# Patient Record
Sex: FEMALE | Race: WHITE | NOT HISPANIC OR LATINO | Employment: FULL TIME | URBAN - NONMETROPOLITAN AREA
[De-identification: names, ages, dates, MRNs, and addresses within clinical notes are randomized per-mention and may not be internally consistent; named-entity substitution may affect disease eponyms.]

---

## 2019-07-31 ENCOUNTER — OFFICE VISIT CONVERTED (OUTPATIENT)
Dept: FAMILY MEDICINE CLINIC | Age: 44
End: 2019-07-31
Attending: FAMILY MEDICINE

## 2019-07-31 ENCOUNTER — HOSPITAL ENCOUNTER (OUTPATIENT)
Dept: OTHER | Facility: HOSPITAL | Age: 44
Discharge: HOME | End: 2019-07-31
Attending: FAMILY MEDICINE

## 2019-07-31 LAB
ALBUMIN SERPL-MCNC: 4.5 G/DL (ref 3.5–5)
ALBUMIN/GLOB SERPL: 1.5 {RATIO} (ref 1.4–2.6)
ALP SERPL-CCNC: 81 U/L (ref 42–98)
ALT SERPL-CCNC: 18 U/L (ref 10–40)
ANION GAP SERPL CALC-SCNC: 19 MMOL/L (ref 8–19)
AST SERPL-CCNC: 19 U/L (ref 15–50)
BASOPHILS # BLD MANUAL: 0.01 10*3/UL (ref 0–0.2)
BASOPHILS NFR BLD MANUAL: 0.1 % (ref 0–3)
BILIRUB SERPL-MCNC: 0.25 MG/DL (ref 0.2–1.3)
BUN SERPL-MCNC: 9 MG/DL (ref 5–25)
BUN/CREAT SERPL: 10 {RATIO} (ref 6–20)
CALCIUM SERPL-MCNC: 9.6 MG/DL (ref 8.7–10.4)
CHLORIDE SERPL-SCNC: 101 MMOL/L (ref 99–111)
CHOLEST SERPL-MCNC: 134 MG/DL (ref 107–200)
CHOLEST/HDLC SERPL: 2.4 {RATIO} (ref 3–6)
CONV CO2: 24 MMOL/L (ref 22–32)
CONV TOTAL PROTEIN: 7.6 G/DL (ref 6.3–8.2)
CREAT UR-MCNC: 0.87 MG/DL (ref 0.5–0.9)
DEPRECATED RDW RBC AUTO: 45.4 FL
EOSINOPHIL # BLD MANUAL: 0.07 10*3/UL (ref 0–0.7)
EOSINOPHIL NFR BLD MANUAL: 1 % (ref 0–7)
ERYTHROCYTE [DISTWIDTH] IN BLOOD BY AUTOMATED COUNT: 13.2 % (ref 11.5–14.5)
GFR SERPLBLD BASED ON 1.73 SQ M-ARVRAT: >60 ML/MIN/{1.73_M2}
GLOBULIN UR ELPH-MCNC: 3.1 G/DL (ref 2–3.5)
GLUCOSE SERPL-MCNC: 82 MG/DL (ref 65–99)
GRANS (ABSOLUTE): 4.4 10*3/UL (ref 2–8)
GRANS: 60.8 % (ref 30–85)
HBA1C MFR BLD: 13.4 G/DL (ref 12–16)
HCT VFR BLD AUTO: 39.1 % (ref 37–47)
HDLC SERPL-MCNC: 56 MG/DL (ref 40–60)
IMM GRANULOCYTES # BLD: 0 10*3/UL (ref 0–0.54)
IMM GRANULOCYTES NFR BLD: 0 % (ref 0–0.43)
LDLC SERPL CALC-MCNC: 57 MG/DL (ref 70–100)
LYMPHOCYTES # BLD MANUAL: 2.24 10*3/UL (ref 1–5)
LYMPHOCYTES NFR BLD MANUAL: 7.1 % (ref 3–10)
MCH RBC QN AUTO: 31.6 PG (ref 27–31)
MCHC RBC AUTO-ENTMCNC: 34.3 G/DL (ref 33–37)
MCV RBC AUTO: 92.2 FL (ref 81–99)
MONOCYTES # BLD AUTO: 0.51 10*3/UL (ref 0.2–1.2)
OSMOLALITY SERPL CALC.SUM OF ELEC: 288 MOSM/KG (ref 273–304)
PLATELET # BLD AUTO: 380 10*3/UL (ref 130–400)
PMV BLD AUTO: 9.7 FL (ref 7.4–10.4)
POTASSIUM SERPL-SCNC: 4.1 MMOL/L (ref 3.5–5.3)
RBC # BLD AUTO: 4.24 10*6/UL (ref 4.2–5.4)
SODIUM SERPL-SCNC: 140 MMOL/L (ref 135–147)
TRIGL SERPL-MCNC: 104 MG/DL (ref 40–150)
TSH SERPL-ACNC: 1.27 M[IU]/L (ref 0.27–4.2)
VARIANT LYMPHS NFR BLD MANUAL: 31 % (ref 20–45)
VLDLC SERPL-MCNC: 21 MG/DL (ref 5–37)
WBC # BLD AUTO: 7.23 10*3/UL (ref 4.8–10.8)

## 2019-08-15 ENCOUNTER — OFFICE VISIT CONVERTED (OUTPATIENT)
Dept: PODIATRY | Facility: CLINIC | Age: 44
End: 2019-08-15
Attending: PODIATRIST

## 2019-08-19 ENCOUNTER — HOSPITAL ENCOUNTER (OUTPATIENT)
Dept: OTHER | Facility: HOSPITAL | Age: 44
Discharge: HOME | End: 2019-08-19
Attending: PODIATRIST

## 2019-08-22 ENCOUNTER — OFFICE VISIT CONVERTED (OUTPATIENT)
Dept: PODIATRY | Facility: CLINIC | Age: 44
End: 2019-08-22
Attending: PODIATRIST

## 2019-09-04 ENCOUNTER — OFFICE VISIT CONVERTED (OUTPATIENT)
Dept: FAMILY MEDICINE CLINIC | Age: 44
End: 2019-09-04
Attending: FAMILY MEDICINE

## 2019-10-31 ENCOUNTER — HOSPITAL ENCOUNTER (OUTPATIENT)
Dept: SLEEP MEDICINE | Facility: HOSPITAL | Age: 44
Discharge: HOME | End: 2019-10-31
Attending: INTERNAL MEDICINE

## 2019-11-07 ENCOUNTER — OFFICE VISIT CONVERTED (OUTPATIENT)
Dept: PODIATRY | Facility: CLINIC | Age: 44
End: 2019-11-07
Attending: PODIATRIST

## 2019-11-07 ENCOUNTER — HOSPITAL ENCOUNTER (OUTPATIENT)
Dept: OTHER | Facility: HOSPITAL | Age: 44
Discharge: HOME | End: 2019-11-07
Attending: PODIATRIST

## 2019-11-11 ENCOUNTER — HOSPITAL ENCOUNTER (OUTPATIENT)
Dept: SLEEP MEDICINE | Facility: HOSPITAL | Age: 44
Discharge: HOME | End: 2019-11-11
Attending: INTERNAL MEDICINE

## 2020-04-30 ENCOUNTER — OFFICE VISIT CONVERTED (OUTPATIENT)
Dept: FAMILY MEDICINE CLINIC | Age: 45
End: 2020-04-30
Attending: FAMILY MEDICINE

## 2020-08-21 ENCOUNTER — HOSPITAL ENCOUNTER (OUTPATIENT)
Dept: OTHER | Facility: HOSPITAL | Age: 45
Discharge: HOME | End: 2020-08-21
Attending: FAMILY MEDICINE

## 2020-08-21 ENCOUNTER — CONVERSION ENCOUNTER (OUTPATIENT)
Dept: FAMILY MEDICINE CLINIC | Age: 45
End: 2020-08-21

## 2020-08-23 LAB — SARS-COV-2 RNA SPEC QL NAA+PROBE: NOT DETECTED

## 2021-05-15 VITALS
WEIGHT: 180 LBS | HEIGHT: 66 IN | HEART RATE: 102 BPM | SYSTOLIC BLOOD PRESSURE: 136 MMHG | BODY MASS INDEX: 28.93 KG/M2 | OXYGEN SATURATION: 98 % | DIASTOLIC BLOOD PRESSURE: 87 MMHG

## 2021-05-15 VITALS
DIASTOLIC BLOOD PRESSURE: 79 MMHG | HEART RATE: 73 BPM | WEIGHT: 159 LBS | OXYGEN SATURATION: 100 % | HEIGHT: 66 IN | BODY MASS INDEX: 25.55 KG/M2 | SYSTOLIC BLOOD PRESSURE: 120 MMHG

## 2021-05-15 VITALS
DIASTOLIC BLOOD PRESSURE: 73 MMHG | BODY MASS INDEX: 25.55 KG/M2 | SYSTOLIC BLOOD PRESSURE: 133 MMHG | HEART RATE: 70 BPM | HEIGHT: 66 IN | OXYGEN SATURATION: 100 % | WEIGHT: 159 LBS

## 2021-05-18 NOTE — PROGRESS NOTES
"Ernestine Rhodes 1975     Office/Outpatient Visit    Visit Date: Wed, Jul 31, 2019 01:11 pm    Provider: Justo Kunz MD (Assistant: Amna Jauregui MA)    Location: [facility]        Electronically signed by Justo Kunz MD on  07/31/2019 06:31:58 PM                             SUBJECTIVE:        CC:     Mrs. Rhodes is a 44 year old White female.  This is her first visit to the clinic.  establishment, discuss depression. She states she broke L foot January 2019, she was told that it is better but is still having pain.;         HPI: Pt just moved here from [place], [place]. She moved here to help her dad who is struggling with dementia. Her  is retired [employer]. I see her dad Nahid Devi. She is originally from all over [place].         PHQ-9 Depression Screening: Completed form scanned and in chart; Total Score 19 Alcohol Consumption Screening: Completed form scanned and in chart; Total Score 6     BP today is 136/91 with HR of 80. She is on HCTZ 12.5 mg qd. She just got a refill from previous PCP, [facility]. She does not check BP between doctor visits.     Pt reports she is not feeling very well. She has a hard time sleeping at night but is very tired during the day. She has low motivation. She cannot concentrate, feels \"out of it.\" She is angry and irritable. She used to do crafts and painting but not anymore due to loss of interest. These Sx present for a few months but worse since moving to [place] 2 months ago. Moving here was her idea. She doesn't think her dad's health is the main factor. She bought an old house in [place] and she is planing her son's wedding which will be next week. 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 She was running/walking until about 2 months ago when she broke her foot.     Pt broke her left foot about 2 months ago. She was organizing, sitting on bed and her foot fell asleep. " She jumped off the bed and landed on it weird and broke 5th MT. This was treated non-operative with walking boot.     ROS:     CONSTITUTIONAL:  Negative for fatigue and fever.      EYES:  Negative for blurred vision.      E/N/T:  Negative for diminished hearing and nasal congestion.      CARDIOVASCULAR:  Negative for chest pain and palpitations.      RESPIRATORY:  Negative for recent cough and dyspnea.      GASTROINTESTINAL:  Negative for abdominal pain, constipation, diarrhea, nausea and vomiting.      GENITOURINARY:  Negative for dysuria and urinary incontinence.      MUSCULOSKELETAL:  Positive for limb pain ( left foot ).   Negative for arthralgias or myalgias.      INTEGUMENTARY/BREAST:  Negative for atypical mole(s) and rash.      NEUROLOGICAL:  Negative for paresthesias and weakness.      PSYCHIATRIC:  Positive for depression.   Negative for anxiety or sleep disturbance.          PMH/FMH/SH:     Last Reviewed on 2019 01:45 PM by Justo Kunz    Past Medical History:                 PAST MEDICAL HISTORY         Hypertension     Depression         PREVENTIVE HEALTH MAINTENANCE             MAMMOGRAM: was last done  with normal results     PAP SMEAR: was last done 2019 with normal results         Surgical History:         Appendectomy: ;       section: X 1; 1998;     carpal tunnel;         Family History:     Father: Coronary Artery Disease; Type 2 Diabetes; Hypertension;  dementia     Mother: Type 2 Diabetes; Hypertension; Obesity         Social History:     Occupation: Homemaker     Marital Status:  ( and remarried)     Children: 3 children (ages 24, 22, 19 )     Hobbies/Recreation: she enjoys crafts;     Mrs. Rhodes denies any current form of exercise.          Tobacco/Alcohol/Supplements:     Last Reviewed on 2019 01:45 PM by Justo Kunz    Tobacco: Current Smoker: She currently smokes some days, once every 3-4 weeks.          Alcohol: Frequency:     once a week; When she drinks, the average quantity of alcohol is 5-9 drinks.   She typically consumes white wine and bourbon.      Caffeine:  She admits to consuming caffeine via coffee ( -1/2 cup ).          Substance Abuse History:     Last Reviewed on 7/31/2019 01:45 PM by Justo Kunz        Marijuana: Prior (no current use).          Mental Health History:     Last Reviewed on 7/31/2019 01:45 PM by Justo Kunz        Panic Attacks      Major Depression         Communicable Diseases (eg STDs):     Last Reviewed on 7/31/2019 01:45 PM by Justo Kunz            Current Problems:     Last Reviewed on 7/31/2019 01:45 PM by Justo Kunz    Major depression, recurrent episode, moderate     Benign HTN     Screening for depression         Immunizations:     None        Allergies:     Last Reviewed on 7/31/2019 01:45 PM by Justo Kunz    Penicillins:    Egg:        Current Medications:     Last Reviewed on 7/31/2019 01:45 PM by Justo Kunz    Epinephrine Auto-Injector 0.3mg Solution For Injection use prn anaphylactic reaction     Hydrochlorothiazide (HCTZ) 12.5mg Tablet 1 po daily     Wellbutrin SR 150mg Tablets, Sustained Release 1 tab daily         OBJECTIVE:        Vitals:         Current: 7/31/2019 1:19:13 PM    Ht:  5 ft, 6 in;  Wt: 161.2 lbs;  BMI: 26.0    T: 98.4 F;  BP: 136/91 mm Hg (left arm, sitting);  P: 80 bpm (left arm (BP Cuff), sitting)        Exams:     PHYSICAL EXAM:     GENERAL: vital signs recorded - well developed, well nourished;  well groomed;  no apparent distress;     EYES: extraocular movements intact; conjunctiva and cornea are normal; PERRLA;     E/N/T: OROPHARYNX:  normal mucosa, dentition, gingiva, and posterior pharynx;     NECK: range of motion is normal; thyroid exam reveals not enlarged;     RESPIRATORY: normal respiratory rate and pattern with no distress; normal breath sounds with no rales, rhonchi, wheezes or rubs;     CARDIOVASCULAR: normal rate; rhythm is  regular;  no systolic murmur; no edema;     GASTROINTESTINAL: nontender; normal bowel sounds;     LYMPHATIC: no enlargement of cervical or facial nodes;     MUSCULOSKELETAL: normal gait; normal overall tone     NEUROLOGIC: mental status: alert and oriented x 3; Reflexes: knee jerks: 2+;     PSYCHIATRIC:  appropriate affect and demeanor; normal speech pattern; grossly normal memory;         ASSESSMENT           V79.0   Z13.31  Screening for depression              DDx:     401.1   I10  Benign HTN              DDx:     296.32   F33.1  Major depression, recurrent episode, moderate              DDx:     729.5   M25.572  Foot pain              DDx:         ORDERS:         Meds Prescribed:       Lisinopril 10mg Tablet 1 tab daily  #30 (Thirty) tablet(s) Refills: 2       Refill of: Wellbutrin SR (Bupropion HCl) 150mg Tablets, Sustained Release 1 tab daily  #30 (Thirty) tablet(s) Refills: 2       Sertraline HCl 50mg Tablet 1/2 tab for 2 weeks, then 1 tab qd thereafter.  #30 (Thirty) tablet(s) Refills: 2         Radiology/Test Orders:       [id number]  Left radiologic examination, foot; complete, minimum of three views  (Send-Out)           Lab Orders:       [id number]  [facility] PHYSICAL: CMP, CBC, TSH, LIPID: [id number], [id number], [id number], [id number]  (Send-Out)           Procedures Ordered:       REFER  Referral to Specialist or Other Facility  (Send-Out)           Other Orders:         Depression screen positive and follow up plan documented  (In-House)           Positive EtOH screen with counseling documented  (In-House)                   PLAN:          Screening for depression     MIPS PHQ-9 Depression Screening: Completed form scanned and in chart; Total Score 19 Positive Depression Screen: Suicide Risk Assessment completed--denies suicidal/homicidal ideation; Pharmacologic intervention initiated/modified Positive alcohol screen: Brief counseling on the harms of alcohol use (less than 8 minutes).            Orders:          Depression screen positive and follow up plan documented  (In-House)           Positive EtOH screen with counseling documented  (In-House)            Benign HTN Due to slightly elevated BP today I am starting lisinopril 10 mg qd in addition to HCTZ 1.5 mg qd. Pt is advised to check BP a couple times and ideally keep a log of BP results.     LABORATORY:  Labs ordered to be performed today include PHYSICAL PANEL; CMP, CBC, TSH, LIPID.            Prescriptions:       Lisinopril 10mg Tablet 1 tab daily  #30 (Thirty) tablet(s) Refills: 2           Orders:       [id number]  [facility] PHYSICAL: CMP, CBC, TSH, LIPID: [id number], [id number], [id number]  (Send-Out)            Major depression, recurrent episode, moderate Pt very much meets criteria for MDD. We discussed starting running again once foot heals. Exercise in general will help. I am adding sertraline to wellbutrin and she will f/u in 1 month.           Prescriptions:       Refill of: Wellbutrin SR (Bupropion HCl) 150mg Tablets, Sustained Release 1 tab daily  #30 (Thirty) tablet(s) Refills: 2       Sertraline HCl 50mg Tablet 1/2 tab for 2 weeks, then 1 tab qd thereafter.  #30 (Thirty) tablet(s) Refills: 2          Foot pain x-ray ordered and pt referred to podiatry.         RADIOLOGY:  I have ordered a left foot x-ray to be done today.      REFERRALS:  Referral initiated to a podiatrist ( [facility] ).            Orders:       [id number]  Left radiologic examination, foot; complete, minimum of three views  (Send-Out)         REFER  Referral to Specialist or Other Facility  (Send-Out)               CHARGE CAPTURE           **Please note: ICD descriptions below are intended for billing purposes only and may not represent clinical diagnoses**        Primary Diagnosis:         V79.0 Screening for depression            Z13.31    Encounter for screening for depression              Orders:          [id number]   Office visit - new pt, level 3  (In-House)                 Depression screen positive and follow up plan documented  (In-House)                Positive EtOH screen with counseling documented  (In-House)           401.1 Benign HTN            I10    Essential (primary) hypertension    296.32 Major depression, recurrent episode, moderate            F33.1    Major depressive disorder, recurrent, moderate    729.5 Foot pain            M25.572    Pain in left ankle and joints of left foot        ADDENDUMS:      ____________________________________    Date: 08/01/2019 09:50 AM    Author: Melissa Mckenna         Visit Note Faxed to:        [facility]  (Podiatry); Number [phone]

## 2021-05-18 NOTE — PROGRESS NOTES
"Ernestine Condon  1975     Office/Outpatient Visit    Visit Date: Thu, Apr 30, 2020 09:19 am    Provider: Justo Kunz MD (Assistant: Amna Jauregui MA)    Location: [facility]        Electronically signed by Justo Kunz MD on  04/30/2020 05:29:21 PM                             Subjective:        CC: NOT TAKING OMEPRAZOLEMrs. Roseanna is a 45 year old White female.  Patient states she wants to discuss getting off work for COVID due to her health issues.;         HPI:       She does not check BP between visits. She is on HCTZ 12.5 mg qd and lisinopril 10 mg qd but she has not been taking this recently. Pt had a sleep study on 11/18/19 that was positive for NATALIA. She is now using a CPAP.      Pt was recently on zoloft and welbutrin for anxiety and depression. These medicines were working well but when her refills ran out she did not request another and she reports her mood is \"fine.\"      For asthma she is on albuterol, sinugulair, cetirizine, and allergy shots. She works in [employer], unloading cans. She uses albuterol daily, she gets asthma exacerbation almost daily.     ROS:     CONSTITUTIONAL:  Positive for fatigue ( mild ).   Negative for fever.      E/N/T:  Positive for nasal congestion and sinus pressure.   Negative for diminished hearing.      CARDIOVASCULAR:  Negative for chest pain and palpitations.      RESPIRATORY:  Positive for recent cough ( with scant amounts of clear or white sputum ), dyspnea ( with moderate exertion ) and frequent wheezing.      GASTROINTESTINAL:  Positive for heartburn.   Negative for abdominal pain, constipation, diarrhea, nausea or vomiting.      MUSCULOSKELETAL:  Negative for arthralgias and myalgias.      INTEGUMENTARY/BREAST:  Negative for atypical mole(s) and rash.      NEUROLOGICAL:  Negative for paresthesias and weakness.      PSYCHIATRIC:  Positive for depression and insomnia.   Negative for anxiety.          Past Medical History / Family " History / Social History:         Last Reviewed on 2020 05:23 PM by Justo Kunz    Past Medical History:                 PAST MEDICAL HISTORY         Hypertension     Depression         PREVENTIVE HEALTH MAINTENANCE             MAMMOGRAM: was last done  with normal results     PAP SMEAR: was last done 2019 with normal results         Surgical History:         Appendectomy: 2016;      section: X 1; 1998;     carpal tunnel;         Family History:     Father: Coronary Artery Disease; Type 2 Diabetes; Hypertension;  dementia     Mother: Type 2 Diabetes; Hypertension; Obesity         Social History:     Occupation: Homemaker     Marital Status:  ( and remarried)     Children: 3 children (ages 24, 22, 19 )     Hobbies/Recreation: she enjoys crafts;     Mrs. Rhodes denies any current form of exercise.          Tobacco/Alcohol/Supplements:     Last Reviewed on 2020 05:23 PM by Justo Kunz    Tobacco: Current Smoker: She currently smokes some days, once every 3-4 weeks.          Alcohol: Frequency:    once a week; When she drinks, the average quantity of alcohol is 5-9 drinks.   She typically consumes white wine and bourbon.      Caffeine:  She admits to consuming caffeine via coffee ( -1/2 cup ).          Substance Abuse History:     Last Reviewed on 2020 05:23 PM by Justo Kunz        Marijuana: Prior (no current use).          Mental Health History:     Last Reviewed on 2020 05:23 PM by Justo Kunz        Panic Attacks     Major Depression         Communicable Diseases (eg STDs):     Last Reviewed on 2020 05:23 PM by Justo Kunz        Current Problems:     Last Reviewed on 2020 05:23 PM by Justo Kunz    Major depressive disorder, recurrent, moderate    Essential (primary) hypertension    Heartburn    Mild persistent asthma, uncomplicated        Immunizations:     None        Allergies:     Last Reviewed on 2020 05:23 PM  by Justo Kunz    Penicillins:      Egg:      Bee stings:          Current Medications:     Last Reviewed on 4/30/2020 05:23 PM by Justo Kunz    hydroCHLOROthiazide 12.5 mg oral tablet [1 po daily]    EPINEPHrine 0.3 mg/0.3 mL injection Auto-Injector [use prn anaphylactic reaction]    Omeprazole 20 mg oral capsule,delayed release (enteric coated) [one a day]    Allergy Inject  [3 times a week]    CPAP     MONTELUKAST 10MG TAB  [TAKE 1 TABLET BY MOUTH AT BEDTIME]    CETIRIZINE 10MG     TAB  [TAKE 1 TABLET BY MOUTH ONCE DAILY]    albuterol sulfate 90 mcg/actuation Inhalation HFA Aerosol Inhaler [inhale 2 puffs  by inhalation route every 6 hours as needed]        Objective:        Exams:     PHYSICAL EXAM:     GENERAL: well developed, well nourished;  well groomed;  no apparent distress, tired-appearing;     EYES: extraocular movements intact;     NECK: range of motion is normal;     RESPIRATORY: normal respiratory rate and pattern with no distress;     CARDIOVASCULAR: no cyanosis;     MUSCULOSKELETAL: normal gait;     NEUROLOGIC: mental status: alert and oriented x 3; GROSSLY INTACT     PSYCHIATRIC: affect/demeanor: depressed;  normal psychomotor function; normal speech pattern; normal thought and perception;         Assessment:         I10   Essential (primary) hypertension       F33.1   Major depressive disorder, recurrent, moderate       J45.40   Moderate persistent asthma, uncomplicated           ORDERS:         Meds Prescribed:       [Refilled] hydroCHLOROthiazide 12.5 mg oral tablet [1 tab po daily], #90 (ninety) tablets, Refills: 2 (two)       [New Rx] Advair -21 mcg/actuation Inhalation HFA Aerosol Inhaler [inhale 2 puffs by inhalation route 2 times per day in the morning andevening], #12 (twelve) grams, Refills: 2 (two)                 Plan:         Essential (primary) hypertensionWill restart HCTZ 12.5 mg qd for HTN. Pt advised to check BP once or twice a week and lisinopril 10 mg qd can be  added if needed.     Telehealth: Verbal consent obtained for visit to occur via televideo conferencing; Staff, other than provider, present during telephone visit include Amna Frankel School/Work Excuse for Today           Prescriptions:       [Refilled] hydroCHLOROthiazide 12.5 mg oral tablet [1 tab po daily], #90 (ninety) tablets, Refills: 2 (two)         Major depressive disorder, recurrent, moderateDepression appears to be stable at this time without medications. Will discuss more at f/u visit.         Moderate persistent asthma, uncomplicatedGiven patient moderate persistent asthma she will be granted time off work due to concerns over COVID 19 pandemic.          Prescriptions:       [New Rx] Advair -21 mcg/actuation Inhalation HFA Aerosol Inhaler [inhale 2 puffs by inhalation route 2 times per day in the morning andevening], #12 (twelve) grams, Refills: 2 (two)             Charge Capture:         Primary Diagnosis:     I10  Essential (primary) hypertension           Orders:      [id number]  Office/outpatient visit; established patient, level 4  (In-House)              F33.1  Major depressive disorder, recurrent, moderate     J45.40  Moderate persistent asthma, uncomplicated

## 2021-05-18 NOTE — PROGRESS NOTES
"Ernestine Condon 1975     Office/Outpatient Visit    Visit Date: Wed, Sep 4, 2019 10:18 am    Provider: Justo Kunz MD (Assistant: Amna Jauregui MA)    Location: [facility]        Electronically signed by Justo Kunz MD on  09/04/2019 06:44:20 PM                             SUBJECTIVE:        CC:     Ms. Condon is a 44 year old White female.  This is a follow-up visit.  check up;         HPI:     BP today is 134/81 with a HR of 92. She is on lisinopril 10 mg qd and HCTZ 12.5 mg qd.     1 month ago we added sertraline to welbutrin for depression. Her  thinks she's less depressed but she cannot tell a difference, maybe a little. She reports constant fatigue. Her son got  about 3 weeks ago and that went well. Sleep is getting better. Unsure if she snores or holds her breath at night. Labs 1 month ago were normal. She has low motivation. She cannot concentrate, feels \"out of it.\" She is angry and irritable. She used to do crafts and painting but not anymore due to loss of interest. These Sx present for a few months but worse since moving to [place] 3 months ago. Moving here was her idea.     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 She saw Dr. Elder on 8/22 (her second visit with him) and he ordered a walking boot for 6 weeks and a bone stimulator that she applies to her foot for 3 hours a day for the next 6 weeks. She will f/u with [facility] in about a month.     Pt has severe sinus and nasal congestion, watery eyes. Worse since moving to [place]. She takes claritin and flonase with a little relief. She has several allergies, bees, egg, wheat, barley.     Pt reports having bad acid reflux that can wake her up at night, burning sensation and acid in mouth. Her dad had esophageal cancer that was Dx'd in early 70s. She tries Tums and baking soda some times. No vomiting blood.     Pt reports excessive daytime somnolence. She has a tendency to " doze in the afternoon if sitting still or after lunch. She is sleepy when driving and even more so as a passenger. She is unsure if she snores or has breath holding spells at night.     ROS:     CONSTITUTIONAL:  Positive for fatigue ( severe; very sleepy ).   Negative for fever.      EYES:  Positive for eye drainage.   Negative for blurred vision.      E/N/T:  Positive for nasal congestion and sinus pressure.   Negative for diminished hearing.      CARDIOVASCULAR:  Negative for chest pain and palpitations.      RESPIRATORY:  Negative for recent cough and dyspnea.      GASTROINTESTINAL:  Positive for heartburn.   Negative for abdominal pain, constipation, diarrhea, nausea or vomiting.      GENITOURINARY:  Negative for dysuria and urinary incontinence.      MUSCULOSKELETAL:  Positive for limb pain ( left foot ).   Negative for arthralgias or myalgias.      INTEGUMENTARY/BREAST:  Negative for atypical mole(s) and rash.      NEUROLOGICAL:  Negative for paresthesias and weakness.      PSYCHIATRIC:  Positive for depression and insomnia.   Negative for anxiety.          PMH/FMH/SH:     Last Reviewed on 2019 06:40 PM by Justo Kunz    Past Medical History:                 PAST MEDICAL HISTORY         Hypertension     Depression         PREVENTIVE HEALTH MAINTENANCE             MAMMOGRAM: was last done  with normal results     PAP SMEAR: was last done 2019 with normal results         Surgical History:         Appendectomy: 2016;       section: X 1; ;     carpal tunnel;         Family History:     Father: Coronary Artery Disease; Type 2 Diabetes; Hypertension;  dementia     Mother: Type 2 Diabetes; Hypertension; Obesity         Social History:     Occupation: Homemaker     Marital Status:  ( and remarried)     Children: 3 children (ages 24, 22, 19 )     Hobbies/Recreation: she enjoys crafts;     Mrs. Rhodes denies any current form of exercise.           Tobacco/Alcohol/Supplements:     Last Reviewed on 9/04/2019 06:40 PM by Justo Kunz    Tobacco: Current Smoker: She currently smokes some days, once every 3-4 weeks.          Alcohol: Frequency:    once a week; When she drinks, the average quantity of alcohol is 5-9 drinks.   She typically consumes white wine and bourbon.      Caffeine:  She admits to consuming caffeine via coffee ( -1/2 cup ).          Substance Abuse History:     Last Reviewed on 9/04/2019 06:40 PM by Justo Kunz        Marijuana: Prior (no current use).          Mental Health History:     Last Reviewed on 9/04/2019 06:40 PM by Justo Kunz        Panic Attacks      Major Depression         Communicable Diseases (eg STDs):     Last Reviewed on 9/04/2019 06:40 PM by Justo Kunz            Current Problems:     Last Reviewed on 9/04/2019 06:40 PM by Justo Kunz    GERD     Major depression, recurrent episode, moderate     Benign HTN     Daytime somnolence     Seasonal allergies     Malunion of fracture     Foot pain     Screening for depression         Immunizations:     None        Allergies:     Last Reviewed on 9/04/2019 06:40 PM by Justo Kunz    Penicillins:    Egg:    Bee stings:        Current Medications:     Last Reviewed on 9/04/2019 06:40 PM by Justo Kunz    Lisinopril 10mg Tablet 1 tab daily     Sertraline HCl 50mg Tablet 1/2 tab for 2 weeks, then 1 tab qd thereafter.     Wellbutrin SR 150mg Tablets, Sustained Release 1 tab daily     Epinephrine Auto-Injector 0.3mg Solution For Injection use prn anaphylactic reaction     Hydrochlorothiazide (HCTZ) 12.5mg Tablet 1 po daily         OBJECTIVE:        Vitals:         Current: 9/4/2019 10:23:19 AM    Ht:  5 ft, 6 in;  Wt: 161.6 lbs;  BMI: 26.1    T: 98.1 F;  BP: 134/81 mm Hg (left arm, sitting);  P: 92 bpm (left arm (BP Cuff), sitting);  sCr: 0.87 mg/dL;  GFR: 84.34        Exams:     PHYSICAL EXAM:     GENERAL: vital signs recorded - well developed, well  nourished;  well groomed;  no apparent distress, tired-appearing;     EYES: extraocular movements intact; conjunctiva and cornea are normal; PERRLA;     E/N/T: OROPHARYNX:  normal mucosa, dentition, gingiva, and posterior pharynx;     NECK: range of motion is normal; thyroid exam reveals not enlarged;     RESPIRATORY: normal respiratory rate and pattern with no distress; normal breath sounds with no rales, rhonchi, wheezes or rubs;     CARDIOVASCULAR: normal rate; rhythm is regular;  no systolic murmur; no edema;     GASTROINTESTINAL: nontender; normal bowel sounds;     MUSCULOSKELETAL: normal gait; normal overall tone     NEUROLOGIC: mental status: alert and oriented x 3;     PSYCHIATRIC: affect/demeanor: depressed;  normal psychomotor function; normal speech pattern; normal thought and perception;         ASSESSMENT           401.1   I10  Benign HTN              DDx:     296.32   F33.1  Major depression, recurrent episode, moderate              DDx:     733.81   S42.002P  Malunion of fracture              DDx:     477.0   J30.2  Seasonal allergies              DDx:     530.81   R12  GERD              DDx:     780.02   R40.0  Daytime somnolence              DDx:         ORDERS:         Meds Prescribed:       Omeprazole 20mg Capsules, Extended Release one a day  #30 (Thirty) capsule(s) Refills: 2         Lab Orders:       APPTO  Appointment need  (In-House)           Procedures Ordered:       REFER  Referral to Specialist or Other Facility  (Send-Out)         REFER  Referral to Specialist or Other Facility  (Send-Out)                   PLAN:          Benign HTN Cont lisinopril 10 mg qd and HCTZ 12.5 mg qd.         FOLLOW-UP: Schedule a follow-up appointment in 7 weeks..            Orders:       APPTO  Appointment need  (In-House)            Major depression, recurrent episode, moderate Cont sertraline 50 mg qd as this seems to be helping a little, considering increasing dose to 100 mg qd.          Malunion of  fracture f/u with podiatry.          Seasonal allergies Pt referred for allergy testing.         REFERRALS:  Referral initiated to an allergist ( [facility] ).            Orders:       REFER  Referral to Specialist or Other Facility  (Send-Out)            GERD Will try omeprazle for GERD, we can increase to 40 mg qd if needed or switch to zantac or protonix.           Prescriptions:       Omeprazole 20mg Capsules, Extended Release one a day  #30 (Thirty) capsule(s) Refills: 2          Daytime somnolence Pt referred to [facility] sleep lab for excessive daytime somnolence concerning for NATALIA.         REFERRALS:  Referral initiated to [facility].            Orders:       REFER  Referral to Specialist or Other Facility  (Send-Out)               Patient Recommendations:        For  Benign HTN:     Schedule a follow-up visit in 7 weeks.                APPOINTMENT INFORMATION:        Monday Tuesday Wednesday Thursday Friday Saturday Sunday            Time:___________________AM  PM   Date:_____________________             CHARGE CAPTURE           **Please note: ICD descriptions below are intended for billing purposes only and may not represent clinical diagnoses**        Primary Diagnosis:         401.1 Benign HTN            I10    Essential (primary) hypertension              Orders:          23855   Office/outpatient visit; established patient, level 4  (In-House)             APPTO   Appointment need  (In-House)           296.32 Major depression, recurrent episode, moderate            F33.1    Major depressive disorder, recurrent, moderate    733.81 Malunion of fracture            S42.002P    Fracture of unspecified part of left clavicle, subsequent encounter for fracture with malunion    477.0 Seasonal allergies            J30.2    Other seasonal allergic rhinitis    530.81 GERD            R12    Heartburn    780.02 Daytime somnolence            R40.0    Somnolence

## 2021-07-01 VITALS
HEIGHT: 66 IN | SYSTOLIC BLOOD PRESSURE: 134 MMHG | TEMPERATURE: 98.1 F | WEIGHT: 161.6 LBS | DIASTOLIC BLOOD PRESSURE: 81 MMHG | HEART RATE: 92 BPM | BODY MASS INDEX: 25.97 KG/M2

## 2021-07-01 VITALS
SYSTOLIC BLOOD PRESSURE: 136 MMHG | DIASTOLIC BLOOD PRESSURE: 91 MMHG | BODY MASS INDEX: 25.91 KG/M2 | TEMPERATURE: 98.4 F | HEIGHT: 66 IN | HEART RATE: 80 BPM | WEIGHT: 161.2 LBS

## 2021-07-02 VITALS — BODY MASS INDEX: 29.05 KG/M2 | TEMPERATURE: 98.3 F | HEIGHT: 66 IN

## 2022-01-26 ENCOUNTER — TELEPHONE (OUTPATIENT)
Dept: FAMILY MEDICINE CLINIC | Age: 47
End: 2022-01-26

## 2022-01-26 NOTE — TELEPHONE ENCOUNTER
Caller: Ernestine Condon    Relationship: Self    Best call back number: [phone]     What is the medical concern/diagnosis: ALLERGIES    What specialty or service is being requested: ALLERGIST    What is the provider, practice or medical service name: [facility]    What is the office location: [place]    What is the office phone number:     Any additional details: PATIENT HAS NOT BEEN SEEN BY ANYONE IN THE OFFICE SINCE DR. CHASE. INSURANCE HAS SUGGESTED DR. CUEVAS. PATIENT DOES NOT WANT TO MAKE AN APPOINTMENT. SHE STATES SHE HAS BEEN GETTING THESE ALLERGY SHOTS 2 TIME A WEEK FOR THE PAST 2 YEARS.    PLEASE CALL PATIENT AND ADVISE REGARDING REFERRAL

## 2022-02-14 ENCOUNTER — TELEPHONE (OUTPATIENT)
Dept: FAMILY MEDICINE CLINIC | Age: 47
End: 2022-02-14

## 2022-02-17 ENCOUNTER — OFFICE VISIT (OUTPATIENT)
Dept: FAMILY MEDICINE CLINIC | Age: 47
End: 2022-02-17

## 2022-02-17 VITALS
TEMPERATURE: 98.4 F | DIASTOLIC BLOOD PRESSURE: 94 MMHG | OXYGEN SATURATION: 100 % | HEART RATE: 71 BPM | SYSTOLIC BLOOD PRESSURE: 154 MMHG

## 2022-02-17 DIAGNOSIS — R03.0 ELEVATED BLOOD PRESSURE READING WITHOUT DIAGNOSIS OF HYPERTENSION: Primary | ICD-10-CM

## 2022-02-17 DIAGNOSIS — J45.20 MILD INTERMITTENT ASTHMA, UNSPECIFIED WHETHER COMPLICATED: ICD-10-CM

## 2022-02-17 DIAGNOSIS — J30.2 SEASONAL ALLERGIES: ICD-10-CM

## 2022-02-17 PROCEDURE — 99213 OFFICE O/P EST LOW 20 MIN: CPT | Performed by: NURSE PRACTITIONER

## 2022-02-17 NOTE — PROGRESS NOTES
Chief Complaint  Ernestine Condon presents to [facility] for Allergic Reaction (Insurance needs referral to allergy )    Subjective          History of Present Illness   Here for referral to [facility], has Asthma and allergy, gets 2 shots 2 x a week but has [facility] and they require a new referral every year. Also noted at OV that BP was too high, has hx of elevated BP , was taking HCTZ when Dr Kunz was here but was able to stop it with weight loss, since gaining 30 pounds over last few months thinks that is why her BP was elevated today and does Vape. Needs new PCP and choose [facility]       Review of Systems   Constitutional: Negative for fatigue, fever, unexpected weight gain and unexpected weight loss.   HENT: Negative.    Eyes: Negative.    Respiratory: Negative for cough, shortness of breath and wheezing.    Cardiovascular: Negative for chest pain, palpitations and leg swelling.   Gastrointestinal: Negative for abdominal pain.   Endocrine: Negative.    Genitourinary: Negative for breast lump, breast pain, dysuria, frequency and urgency.   Musculoskeletal: Negative for gait problem.   Skin: Negative.    Neurological: Negative for dizziness, tremors, seizures, weakness and memory problem.   Psychiatric/Behavioral: Negative for behavioral problems and suicidal ideas.         Allergies   Allergen Reactions   • Fluarix [Influenza Virus Vaccine] Other (See Comments)     Pt did not give allergy       History reviewed. No pertinent past medical history.  No current outpatient medications on file.     No current facility-administered medications for this visit.     History reviewed. No pertinent surgical history.   Social History     Tobacco Use   • Smoking status: Not on file   • Smokeless tobacco: Current User   • Tobacco comment: Vapes    Vaping Use   • Vaping Use: Every day   • Substances: Nicotine   • Devices: Pre-filled or refillable cartridge    Substance Use Topics   • Alcohol use: Never   • Drug use: Never     History reviewed. No pertinent family history.  Health Maintenance Due   Topic Date Due   • COLORECTAL CANCER SCREENING  Never done   • ANNUAL PHYSICAL  Never done   • Pneumococcal Vaccine 0-64 (1 of 2 - PPSV23) Never done   • TDAP/TD VACCINES (1 - Tdap) Never done   • HEPATITIS C SCREENING  Never done   • PAP SMEAR  01/26/2022        There is no immunization history on file for this patient.     Objective     Vitals:    02/17/22 0949   BP: 154/94   BP Location: Right arm   Patient Position: Sitting   Pulse: 71   Temp: 98.4 °F (36.9 °C)   TempSrc: Oral   SpO2: 100%     There is no height or weight on file to calculate BMI.     Physical Exam  Constitutional:       Appearance: Normal appearance.   Neck:      Vascular: No carotid bruit.   Cardiovascular:      Rate and Rhythm: Normal rate and regular rhythm.      Heart sounds: Normal heart sounds.   Pulmonary:      Effort: Pulmonary effort is normal.      Breath sounds: Normal breath sounds.   Musculoskeletal:         General: Normal range of motion.   Skin:     General: Skin is warm and dry.   Neurological:      General: No focal deficit present.      Mental Status: She is alert.   Psychiatric:         Mood and Affect: Mood normal.         Behavior: Behavior normal.           Result Review :    No visits with results within 6 Month(s) from this visit.   Latest known visit with results is:   No results found for any previous visit.                        Assessment and Plan      Diagnoses and all orders for this visit:    1. Elevated blood pressure reading without diagnosis of hypertension (Primary)  Comments:  States has been on HCTZ in the past ,but not now . Instructed to check BP bid x 2 weeks and call to Office. Message to [facility] sent to follow up with pt.     2. Mild intermittent asthma, unspecified whether complicated  -     Ambulatory Referral to Allergy    3. Seasonal allergies  -      Ambulatory Referral to Allergy            Follow Up     No follow-ups on file.

## 2022-02-21 ENCOUNTER — TELEPHONE (OUTPATIENT)
Dept: FAMILY MEDICINE CLINIC | Age: 47
End: 2022-02-21

## 2022-02-23 ENCOUNTER — OFFICE VISIT (OUTPATIENT)
Dept: FAMILY MEDICINE CLINIC | Age: 47
End: 2022-02-23

## 2022-02-23 ENCOUNTER — LAB (OUTPATIENT)
Dept: LAB | Facility: HOSPITAL | Age: 47
End: 2022-02-23

## 2022-02-23 VITALS
HEART RATE: 80 BPM | HEIGHT: 66 IN | BODY MASS INDEX: 28.61 KG/M2 | SYSTOLIC BLOOD PRESSURE: 131 MMHG | DIASTOLIC BLOOD PRESSURE: 79 MMHG | WEIGHT: 178 LBS | TEMPERATURE: 98.2 F

## 2022-02-23 DIAGNOSIS — Z13.6 SCREENING FOR CARDIOVASCULAR CONDITION: ICD-10-CM

## 2022-02-23 DIAGNOSIS — I10 HYPERTENSION, UNSPECIFIED TYPE: ICD-10-CM

## 2022-02-23 DIAGNOSIS — Z12.11 SCREENING FOR COLON CANCER: ICD-10-CM

## 2022-02-23 DIAGNOSIS — R92.8 ABNORMAL MAMMOGRAM: Primary | ICD-10-CM

## 2022-02-23 PROBLEM — F32.9 MAJOR DEPRESSION: Status: ACTIVE | Noted: 2022-02-23

## 2022-02-23 PROBLEM — G47.30 SLEEP APNEA: Status: ACTIVE | Noted: 2022-02-23

## 2022-02-23 PROBLEM — Z86.16 HISTORY OF COVID-19: Status: ACTIVE | Noted: 2021-12-25

## 2022-02-23 PROBLEM — F41.0 PANIC ATTACKS: Status: ACTIVE | Noted: 2022-02-23

## 2022-02-23 LAB
ALBUMIN SERPL-MCNC: 4.7 G/DL (ref 3.5–5.2)
ALBUMIN/GLOB SERPL: 2.2 G/DL
ALP SERPL-CCNC: 92 U/L (ref 39–117)
ALT SERPL W P-5'-P-CCNC: 23 U/L (ref 1–33)
ANION GAP SERPL CALCULATED.3IONS-SCNC: 11.6 MMOL/L (ref 5–15)
AST SERPL-CCNC: 18 U/L (ref 1–32)
BASOPHILS # BLD AUTO: 0.02 10*3/MM3 (ref 0–0.2)
BASOPHILS NFR BLD AUTO: 0.2 % (ref 0–1.5)
BILIRUB SERPL-MCNC: 0.2 MG/DL (ref 0–1.2)
BUN SERPL-MCNC: 14 MG/DL (ref 6–20)
BUN/CREAT SERPL: 16.1 (ref 7–25)
CALCIUM SPEC-SCNC: 9.3 MG/DL (ref 8.6–10.5)
CHLORIDE SERPL-SCNC: 104 MMOL/L (ref 98–107)
CHOLEST SERPL-MCNC: 197 MG/DL (ref 0–200)
CO2 SERPL-SCNC: 21.4 MMOL/L (ref 22–29)
CREAT SERPL-MCNC: 0.87 MG/DL (ref 0.57–1)
DEPRECATED RDW RBC AUTO: 50.7 FL (ref 37–54)
EOSINOPHIL # BLD AUTO: 0.1 10*3/MM3 (ref 0–0.4)
EOSINOPHIL NFR BLD AUTO: 1.1 % (ref 0.3–6.2)
ERYTHROCYTE [DISTWIDTH] IN BLOOD BY AUTOMATED COUNT: 14.5 % (ref 12.3–15.4)
GFR SERPL CREATININE-BSD FRML MDRD: 69 ML/MIN/1.73
GLOBULIN UR ELPH-MCNC: 2.1 GM/DL
GLUCOSE SERPL-MCNC: 96 MG/DL (ref 65–99)
HCT VFR BLD AUTO: 39.5 % (ref 34–46.6)
HDLC SERPL-MCNC: 63 MG/DL (ref 40–60)
HGB BLD-MCNC: 13 G/DL (ref 12–15.9)
IMM GRANULOCYTES # BLD AUTO: 0.01 10*3/MM3 (ref 0–0.05)
IMM GRANULOCYTES NFR BLD AUTO: 0.1 % (ref 0–0.5)
LDLC SERPL CALC-MCNC: 121 MG/DL (ref 0–100)
LDLC/HDLC SERPL: 1.9 {RATIO}
LYMPHOCYTES # BLD AUTO: 2.17 10*3/MM3 (ref 0.7–3.1)
LYMPHOCYTES NFR BLD AUTO: 23.6 % (ref 19.6–45.3)
MCH RBC QN AUTO: 30.8 PG (ref 26.6–33)
MCHC RBC AUTO-ENTMCNC: 32.9 G/DL (ref 31.5–35.7)
MCV RBC AUTO: 93.6 FL (ref 79–97)
MONOCYTES # BLD AUTO: 0.52 10*3/MM3 (ref 0.1–0.9)
MONOCYTES NFR BLD AUTO: 5.7 % (ref 5–12)
NEUTROPHILS NFR BLD AUTO: 6.38 10*3/MM3 (ref 1.7–7)
NEUTROPHILS NFR BLD AUTO: 69.3 % (ref 42.7–76)
PLATELET # BLD AUTO: 316 10*3/MM3 (ref 140–450)
PMV BLD AUTO: 9.4 FL (ref 6–12)
POTASSIUM SERPL-SCNC: 4.2 MMOL/L (ref 3.5–5.2)
PROT SERPL-MCNC: 6.8 G/DL (ref 6–8.5)
RBC # BLD AUTO: 4.22 10*6/MM3 (ref 3.77–5.28)
SODIUM SERPL-SCNC: 137 MMOL/L (ref 136–145)
TRIGL SERPL-MCNC: 73 MG/DL (ref 0–150)
TSH SERPL DL<=0.05 MIU/L-ACNC: 1.33 UIU/ML (ref 0.27–4.2)
VLDLC SERPL-MCNC: 13 MG/DL (ref 5–40)
WBC NRBC COR # BLD: 9.2 10*3/MM3 (ref 3.4–10.8)

## 2022-02-23 PROCEDURE — 36415 COLL VENOUS BLD VENIPUNCTURE: CPT

## 2022-02-23 PROCEDURE — 80053 COMPREHEN METABOLIC PANEL: CPT

## 2022-02-23 PROCEDURE — 99214 OFFICE O/P EST MOD 30 MIN: CPT | Performed by: NURSE PRACTITIONER

## 2022-02-23 PROCEDURE — 80061 LIPID PANEL: CPT

## 2022-02-23 PROCEDURE — 85025 COMPLETE CBC W/AUTO DIFF WBC: CPT

## 2022-02-23 PROCEDURE — 84443 ASSAY THYROID STIM HORMONE: CPT

## 2022-02-23 RX ORDER — HYDROCHLOROTHIAZIDE 12.5 MG/1
12.5 TABLET ORAL DAILY
Qty: 90 TABLET | Refills: 1 | Status: SHIPPED | OUTPATIENT
Start: 2022-02-23 | End: 2022-05-24 | Stop reason: SDUPTHER

## 2022-02-23 NOTE — PROGRESS NOTES
Chief Complaint  Ernestine Condon presents to [facility] for mammo results    Subjective          Yani is here to establish care and to follow up on a recent abnormal mammogram.  She was a previous patient of Dr. Kunz who is no longer here and did not have an alternative provider to send the results to.  I have brought her in today to discuss the abnormalities and to have her get additional imaging. She denies any prior abnormal mammogram.  She denies any known family history of breast cancer.  She does do self breast exams most months.       Ernestine presents today for follow up on Hypertension.  Reported as not well controlled.  Cardiac symptoms none.  Current medication / treatment includes none - previous HCTZ  Cardiovascular risk factors: obesity (BMI >= 30 kg/m2)      .         Review of Systems      Allergies   Allergen Reactions   • Fluarix [Influenza Virus Vaccine] Other (See Comments)     Pt did not give allergy    • Penicillins Unknown - High Severity   • Wheat Rash      Past Medical History:   Diagnosis Date   • H/O:     • History of appendectomy    • History of carpal tunnel release      Current Outpatient Medications   Medication Sig Dispense Refill   • hydroCHLOROthiazide (HYDRODIURIL) 12.5 MG tablet Take 1 tablet by mouth Daily. 90 tablet 1     No current facility-administered medications for this visit.     History reviewed. No pertinent surgical history.   Social History     Tobacco Use   • Smoking status: Former Smoker     Packs/day: 0.25     Years: 10.00     Pack years: 2.50     Types: Cigarettes     Quit date: 2020     Years since quittin.0   • Smokeless tobacco: Never Used   • Tobacco comment: Vapes    Vaping Use   • Vaping Use: Every day   • Substances: Nicotine   • Devices: Pre-filled or refillable cartridge   Substance Use Topics   • Alcohol use: Never   • Drug use: Never     Family History   Problem Relation Age of Onset   • Obesity  "Mother    • Hypertension Mother    • Diabetes Mother    • Coronary artery disease Father    • Diabetes Father    • Hypertension Father    • Dementia Father    • Heart attack Father         at age 74     Health Maintenance Due   Topic Date Due   • ANNUAL PHYSICAL  Never done   • TDAP/TD VACCINES (1 - Tdap) Never done   • HEPATITIS C SCREENING  Never done        There is no immunization history on file for this patient.     Objective     Vitals:    02/23/22 0945 02/23/22 0949   BP: 150/98 131/79   BP Location: Left arm Left arm   Patient Position: Sitting Sitting   Pulse: 88 80   Temp: 98.2 °F (36.8 °C)    Weight: 80.7 kg (178 lb)    Height: 167.6 cm (65.98\")      Body mass index is 28.74 kg/m².     Physical Exam  Constitutional:       General: She is not in acute distress.     Appearance: Normal appearance.   HENT:      Head: Normocephalic.   Cardiovascular:      Rate and Rhythm: Normal rate and regular rhythm.   Pulmonary:      Effort: Pulmonary effort is normal.      Breath sounds: Normal breath sounds.   Musculoskeletal:         General: Normal range of motion.   Neurological:      General: No focal deficit present.      Mental Status: She is alert and oriented to person, place, and time.   Psychiatric:         Mood and Affect: Mood normal.         Behavior: Behavior normal.           Result Review :                               Assessment and Plan      Diagnoses and all orders for this visit:    1. Abnormal mammogram (Primary)  Comments:  We will get her scheduled for mammogram and US at [place] and call with any abnormal results and recommendations - continue self breast exams monthly  Orders:  -     Cancel: Mammo Diagnostic Digital Tomosynthesis Left With CAD; Future  -     Cancel: US Breast Left Limited; Future  -     Mammo Diagnostic Digital Tomosynthesis Left With CAD; Future  -     US Breast Left Limited; Future    2. Hypertension, unspecified type  Comments:  will resume HCTZ  recommend ambulatory " monitoring and follow up 3 months  or sooner if not well controlled  Orders:  -     hydroCHLOROthiazide (HYDRODIURIL) 12.5 MG tablet; Take 1 tablet by mouth Daily.  Dispense: 90 tablet; Refill: 1    3. Screening for colon cancer  -     Cologuard - Stool, Per Rectum; Future    4. Screening for cardiovascular condition  -     Basic metabolic panel; Future  -     Comprehensive Metabolic Panel; Future  -     CBC & Differential; Future  -     TSH; Future  -     Lipid Panel; Future              Follow Up     Return in about 3 months (around 5/23/2022).

## 2022-02-28 PROBLEM — R92.8 ABNORMAL MAMMOGRAM: Status: ACTIVE | Noted: 2022-02-28

## 2022-03-10 DIAGNOSIS — R92.8 ABNORMAL MAMMOGRAM: ICD-10-CM

## 2022-03-14 DIAGNOSIS — R92.8 ABNORMAL MAMMOGRAM: Primary | ICD-10-CM

## 2022-05-24 ENCOUNTER — OFFICE VISIT (OUTPATIENT)
Dept: FAMILY MEDICINE CLINIC | Age: 47
End: 2022-05-24

## 2022-05-24 VITALS
SYSTOLIC BLOOD PRESSURE: 130 MMHG | DIASTOLIC BLOOD PRESSURE: 89 MMHG | OXYGEN SATURATION: 97 % | HEIGHT: 66 IN | BODY MASS INDEX: 27.06 KG/M2 | HEART RATE: 104 BPM | WEIGHT: 168.4 LBS

## 2022-05-24 DIAGNOSIS — E66.3 OVERWEIGHT: Primary | ICD-10-CM

## 2022-05-24 DIAGNOSIS — F43.9 STRESS AT HOME: ICD-10-CM

## 2022-05-24 DIAGNOSIS — I10 HYPERTENSION, UNSPECIFIED TYPE: ICD-10-CM

## 2022-05-24 PROCEDURE — 99214 OFFICE O/P EST MOD 30 MIN: CPT | Performed by: NURSE PRACTITIONER

## 2022-05-24 RX ORDER — PHENTERMINE HYDROCHLORIDE 37.5 MG/1
37.5 TABLET ORAL EVERY MORNING
COMMUNITY
Start: 2022-03-28 | End: 2022-11-22

## 2022-05-24 RX ORDER — HYDROCHLOROTHIAZIDE 12.5 MG/1
12.5 TABLET ORAL DAILY
Qty: 90 TABLET | Refills: 1 | Status: SHIPPED | OUTPATIENT
Start: 2022-05-24 | End: 2023-02-13

## 2022-05-24 RX ORDER — EPINEPHRINE 0.3 MG/.3ML
INJECTION SUBCUTANEOUS
COMMUNITY
Start: 2022-03-09

## 2022-05-24 NOTE — PROGRESS NOTES
Assessment and Plan    Diagnoses and all orders for this visit:    1. Overweight (Primary)  Comments:  We will prescribe Contrave however we did discuss insurance coverage/online coupons.  Should she desire to continue this she will call for refills through next   Orders:  -     naltrexone-bupropion ER (CONTRAVE) 8-90 MG tablet; Wk 1: 1 tab daily, Wk 2: 1 tab twice a day, Wk 3: 2 tabs in AM, 1 tab in PM, Wk 4: 2 tabs twice a day, Maintenance dose: 2 tabs twice daily.  Dispense: 120 tablet; Refill: 3    2. Stress at home  Comments:  Referral to psychotherapy per her request  Orders:  -     Ambulatory Referral to Psychotherapy    3. Hypertension, unspecified type  Comments:  I have advised her to check BP at home.  Follow-up sooner should blood pressure remain above 130/80 otherwise 6-month follow-up  Orders:  -     hydroCHLOROthiazide (HYDRODIURIL) 12.5 MG tablet; Take 1 tablet by mouth Daily.  Dispense: 90 tablet; Refill: 1        Follow Up   Return in about 6 months (around 11/24/2022) for Recheck, Annual physical.    Chief Complaint  Ernestine Powell Condon presents to [facility] for Hypertension (3 month follow up)    Subjective          Ernestine presents today for follow up on Hypertension.  Not well controlled.  Cardiac symptoms none.  Current medication / treatment includes hydrochlorothiazide  Has not taken medication today and does not check her BP at home  Cardiovascular risk factors: hypertension      Currently taking Phentermine 1/2 pill Has been on this for 2 month and has lost a total of 10 pounds.  She is requesting us to manage  Her phentermine however we did discuss that I generally do not prescribe this medication due to side effects.  She is requesting alternative therapy.      Ernestine Powell is requesting therapist - would like counseling due to 'life in general'  Never been in counseling in the past individually however she does participate in some of her [facility]  [facility]. No medication is desired   Previously on medication for depression and does not wish to pursue medication management.            Review of Systems    Objective     Vitals:    22 0758   BP: 130/89   BP Location: Left arm   Patient Position: Sitting   Cuff Size: Adult   Pulse: 104   SpO2: 97%   Weight: 76.4 kg (168 lb 6.4 oz)   Height: 167.6 cm (65.98\")     Body mass index is 27.2 kg/m².     Physical Exam  Constitutional:       General: She is not in acute distress.     Appearance: Normal appearance.   HENT:      Head: Normocephalic.   Cardiovascular:      Rate and Rhythm: Normal rate and regular rhythm.   Pulmonary:      Effort: Pulmonary effort is normal.      Breath sounds: Normal breath sounds.   Musculoskeletal:         General: Normal range of motion.   Neurological:      General: No focal deficit present.      Mental Status: She is alert and oriented to person, place, and time.   Psychiatric:         Mood and Affect: Mood normal. Affect is flat.         Behavior: Behavior normal.         Result Review :                    Allergies   Allergen Reactions   • Fluarix [Influenza Virus Vaccine] Other (See Comments)     Pt did not give allergy    • Penicillins Unknown - High Severity   • Wheat Rash      Past Medical History:   Diagnosis Date   • H/O:     • History of appendectomy    • History of carpal tunnel release      Current Outpatient Medications   Medication Sig Dispense Refill   • EPINEPHrine (EPIPEN) 0.3 MG/0.3ML solution auto-injector injection USE AS DIRECTED FOR ACUTE ALLERGIC REACTION     • hydroCHLOROthiazide (HYDRODIURIL) 12.5 MG tablet Take 1 tablet by mouth Daily. 90 tablet 1   • phentermine (ADIPEX-P) 37.5 MG tablet Take 37.5 mg by mouth Every Morning.     • ProAir  (90 Base) MCG/ACT inhaler      • naltrexone-bupropion ER (CONTRAVE) 8-90 MG tablet Wk 1: 1 tab daily, Wk 2: 1 tab twice a day, Wk 3: 2 tabs in AM, 1 tab in PM, Wk 4: 2 tabs twice a day, " Maintenance dose: 2 tabs twice daily. 120 tablet 3     No current facility-administered medications for this visit.     History reviewed. No pertinent surgical history.   Social History     Tobacco Use   • Smoking status: Former Smoker     Packs/day: 0.25     Years: 10.00     Pack years: 2.50     Types: Cigarettes     Quit date: 2020     Years since quittin.2   • Smokeless tobacco: Never Used   • Tobacco comment: Vapes    Vaping Use   • Vaping Use: Every day   • Substances: Nicotine   • Devices: Pre-filled or refillable cartridge   Substance Use Topics   • Alcohol use: Yes     Comment: once in awhile   • Drug use: Never     Family History   Problem Relation Age of Onset   • Obesity Mother    • Hypertension Mother    • Diabetes Mother    • Coronary artery disease Father    • Diabetes Father    • Hypertension Father    • Dementia Father    • Heart attack Father         at age 74     Health Maintenance Due   Topic Date Due   • COLORECTAL CANCER SCREENING  Never done   • ANNUAL PHYSICAL  Never done   • COVID-19 Vaccine (1) Never done   • Pneumococcal Vaccine 0-64 (1 - PCV) Never done   • TDAP/TD VACCINES (1 - Tdap) Never done   • HEPATITIS C SCREENING  Never done        There is no immunization history on file for this patient.

## 2022-06-14 ENCOUNTER — PRIOR AUTHORIZATION (OUTPATIENT)
Dept: FAMILY MEDICINE CLINIC | Age: 47
End: 2022-06-14

## 2022-09-12 ENCOUNTER — HOSPITAL ENCOUNTER (OUTPATIENT)
Dept: MAMMOGRAPHY | Facility: HOSPITAL | Age: 47
Discharge: HOME | End: 2022-09-12

## 2022-09-12 ENCOUNTER — HOSPITAL ENCOUNTER (OUTPATIENT)
Dept: ULTRASOUND IMAGING | Facility: HOSPITAL | Age: 47
Discharge: HOME | End: 2022-09-12

## 2022-09-12 DIAGNOSIS — R92.8 ABNORMAL MAMMOGRAM: ICD-10-CM

## 2022-09-12 PROCEDURE — 77065 DX MAMMO INCL CAD UNI: CPT

## 2022-09-12 PROCEDURE — G0279 TOMOSYNTHESIS, MAMMO: HCPCS

## 2022-09-13 ENCOUNTER — TELEPHONE (OUTPATIENT)
Dept: FAMILY MEDICINE CLINIC | Age: 47
End: 2022-09-13

## 2022-09-13 NOTE — TELEPHONE ENCOUNTER
"Spoke with pt about overdue cologuard .. pt stated \"I thought I had up to a year to complete? I will get it done in the next couple of months\" .. I postponed 9/13/2022 /al   "

## 2022-11-22 ENCOUNTER — OFFICE VISIT (OUTPATIENT)
Dept: FAMILY MEDICINE CLINIC | Age: 47
End: 2022-11-22

## 2022-11-22 VITALS
HEART RATE: 90 BPM | DIASTOLIC BLOOD PRESSURE: 80 MMHG | TEMPERATURE: 98.1 F | BODY MASS INDEX: 26.33 KG/M2 | SYSTOLIC BLOOD PRESSURE: 130 MMHG | WEIGHT: 163.8 LBS | HEIGHT: 66 IN | OXYGEN SATURATION: 100 %

## 2022-11-22 DIAGNOSIS — Z12.4 SCREENING FOR CERVICAL CANCER: ICD-10-CM

## 2022-11-22 DIAGNOSIS — R53.83 FATIGUE, UNSPECIFIED TYPE: ICD-10-CM

## 2022-11-22 DIAGNOSIS — Z01.419 WELL WOMAN EXAM WITH ROUTINE GYNECOLOGICAL EXAM: Primary | ICD-10-CM

## 2022-11-22 PROCEDURE — 99396 PREV VISIT EST AGE 40-64: CPT | Performed by: NURSE PRACTITIONER

## 2022-11-22 PROCEDURE — G0123 SCREEN CERV/VAG THIN LAYER: HCPCS | Performed by: NURSE PRACTITIONER

## 2022-11-22 PROCEDURE — 87624 HPV HI-RISK TYP POOLED RSLT: CPT | Performed by: NURSE PRACTITIONER

## 2022-11-22 NOTE — PROGRESS NOTES
Assessment and Plan   Diagnoses and all orders for this visit:    1. Well woman exam with routine gynecological exam (Primary)  Comments:  annual well woman, PAP every 3 years, HPV every 5 years;  mammogram due in March   Orders:  -     IgP, Aptima HPV; Future  -     IgP, Aptima HPV    2. Fatigue, unspecified type  Comments:  sounds like possible depression  - advised to start contrave (component of wellbutrin)  may need alternative treatment for her mood if persists/worsen    3. Screening for cervical cancer  -     IgP, Aptima HPV; Future  -     IgP, Aptima HPV                  Follow Up   Return in about 8 weeks (around 1/17/2023) for Recheck.    Chief Complaint  Ernestine Condon presents to [facility] for Annual Exam (Physical with pap/CC: patient says she is always fatigued and would like to get some blood work completed. )    Subjective          History of Present Illness  WELL WOMAN EXAM:  Last well woman  exam was 3 year(s) ago.    Last pap smear was done 3 years ago and reported as normal.  Last HPV testing  Think may   Last menstrual period was 11/07/2022.  Reports that her periods have been change in cycle/ heaviness    Current method of contraception is IUD.    Sexually active?  yes  Self breast exams monthly.  Denies any concerns with findings on exam.    Last mammogram Last Completed Mammogram     This patient has no relevant Health Maintenance data.      and reported as abnormal.    Cysts but recommended 6 month follow up due in March 2023    Fatigue: Patient complains of fatigue. Symptoms began several months ago. Has been having some problems with depression / tearful and feeling down/ no motivation to do anything .  She feels like she could sleep all day.  .  She is concerned about her weight gain and her inability to lose weight.  She was prescribed contrave but was afraid to take the mediation due to warning of suicidal ideation.          Review of Systems  "  Constitutional: Positive for fatigue.   Respiratory: Negative for shortness of breath.    Cardiovascular: Negative for chest pain.   Genitourinary: Positive for menstrual problem (change in cycle).   Psychiatric/Behavioral: Positive for sleep disturbance and depressed mood.       Objective     Vitals:    22 1414   BP: 130/80   BP Location: Left arm   Patient Position: Sitting   Cuff Size: Adult   Pulse: 90   Temp: 98.1 °F (36.7 °C)   TempSrc: Oral   SpO2: 100%   Weight: 74.3 kg (163 lb 12.8 oz)   Height: 167.6 cm (65.98\")     Body mass index is 26.45 kg/m².     Physical Exam  Constitutional:       General: She is not in acute distress.     Appearance: Normal appearance.   HENT:      Head: Normocephalic.   Cardiovascular:      Rate and Rhythm: Normal rate and regular rhythm.   Pulmonary:      Effort: Pulmonary effort is normal.      Breath sounds: Normal breath sounds.   Genitourinary:     Exam position: Lithotomy position.      Pubic Area: No rash.       Labia:         Right: No rash or lesion.         Left: No rash or lesion.       Urethra: No prolapse or urethral pain.      Vagina: Normal.      Cervix: Discharge present.      Uterus: Normal.       Adnexa: Right adnexa normal and left adnexa normal.      Comments: IUD string present  Musculoskeletal:         General: Normal range of motion.   Neurological:      General: No focal deficit present.      Mental Status: She is alert and oriented to person, place, and time.   Psychiatric:         Mood and Affect: Mood normal.         Behavior: Behavior normal.         Result Review                        Allergies   Allergen Reactions   • Fluarix [Influenza Virus Vaccine] Other (See Comments)     Pt did not give allergy    • Penicillins Unknown - High Severity   • Barley Grass Rash   • Eggs Or Egg-Derived Products Rash   • Wheat Rash      Past Medical History:   Diagnosis Date   • H/O:     • History of appendectomy    • History of carpal tunnel release  "     Current Outpatient Medications   Medication Sig Dispense Refill   • EPINEPHrine (EPIPEN) 0.3 MG/0.3ML solution auto-injector injection USE AS DIRECTED FOR ACUTE ALLERGIC REACTION     • hydroCHLOROthiazide (HYDRODIURIL) 12.5 MG tablet Take 1 tablet by mouth Daily. 90 tablet 1   • ProAir  (90 Base) MCG/ACT inhaler Inhale 2 puffs As Needed.     • naltrexone-bupropion ER (CONTRAVE) 8-90 MG tablet Wk 1: 1 tab daily, Wk 2: 1 tab twice a day, Wk 3: 2 tabs in AM, 1 tab in PM, Wk 4: 2 tabs twice a day, Maintenance dose: 2 tabs twice daily. 120 tablet 3     No current facility-administered medications for this visit.     History reviewed. No pertinent surgical history.   Health Maintenance Due   Topic Date Due   • COLORECTAL CANCER SCREENING  Never done   • ANNUAL PHYSICAL  Never done   • HEPATITIS C SCREENING  Never done        There is no immunization history on file for this patient.

## 2022-11-23 ENCOUNTER — TELEPHONE (OUTPATIENT)
Dept: FAMILY MEDICINE CLINIC | Age: 47
End: 2022-11-23

## 2022-11-23 DIAGNOSIS — F33.1 MODERATE EPISODE OF RECURRENT MAJOR DEPRESSIVE DISORDER: Primary | ICD-10-CM

## 2022-11-23 RX ORDER — BUPROPION HYDROCHLORIDE 150 MG/1
150 TABLET ORAL DAILY
Qty: 30 TABLET | Refills: 2 | Status: SHIPPED | OUTPATIENT
Start: 2022-11-23 | End: 2023-03-21

## 2022-11-23 NOTE — TELEPHONE ENCOUNTER
Relationship: Self    Best call back number: [phone]    What medication are you requesting: WELLBUTRIN     What are your current symptoms: DEPTRSSION    If a prescription is needed, what is your preferred pharmacy and phone number: [id number] - [place], [place] - [address] [facility] [place] [address]  - [phone]  - [phone] FX     Additional notes: INSURANCE DOES NOT COVER THE CONTRAVE  AND SHE WOULD LIKE A NEW PRESCRIPTION FOR THE WELLBUTRIN SENT TO THE PHARMACY PLEASE NOTIFY WHEN SENT

## 2022-11-30 LAB
CYTOLOGIST CVX/VAG CYTO: NORMAL
CYTOLOGY CVX/VAG DOC CYTO: NORMAL
CYTOLOGY CVX/VAG DOC THIN PREP: NORMAL
DX ICD CODE: NORMAL
HIV 1 & 2 AB SER-IMP: NORMAL
HPV I/H RISK 4 DNA CVX QL PROBE+SIG AMP: NEGATIVE
OTHER STN SPEC: NORMAL
STAT OF ADQ CVX/VAG CYTO-IMP: NORMAL

## 2023-02-13 DIAGNOSIS — I10 HYPERTENSION, UNSPECIFIED TYPE: ICD-10-CM

## 2023-02-13 RX ORDER — HYDROCHLOROTHIAZIDE 12.5 MG/1
12.5 TABLET ORAL DAILY
Qty: 90 TABLET | Refills: 0 | Status: SHIPPED | OUTPATIENT
Start: 2023-02-13

## 2023-03-21 DIAGNOSIS — F33.1 MODERATE EPISODE OF RECURRENT MAJOR DEPRESSIVE DISORDER: ICD-10-CM

## 2023-03-21 RX ORDER — BUPROPION HYDROCHLORIDE 150 MG/1
150 TABLET ORAL DAILY
Qty: 30 TABLET | Refills: 2 | Status: SHIPPED | OUTPATIENT
Start: 2023-03-21

## 2023-03-30 ENCOUNTER — TELEPHONE (OUTPATIENT)
Dept: FAMILY MEDICINE CLINIC | Age: 48
End: 2023-03-30
Payer: OTHER GOVERNMENT

## 2023-03-30 DIAGNOSIS — J45.20 MILD INTERMITTENT ASTHMA, UNSPECIFIED WHETHER COMPLICATED: Primary | ICD-10-CM

## 2023-05-20 DIAGNOSIS — I10 HYPERTENSION, UNSPECIFIED TYPE: ICD-10-CM

## 2023-05-23 NOTE — TELEPHONE ENCOUNTER
[facility] SHARED 05/23/2023  APPOINTMENT SCHEDULED   INSURANCE HAS CHANGED HER PCP TO CLEMENT MARIE

## 2023-05-24 RX ORDER — HYDROCHLOROTHIAZIDE 12.5 MG/1
12.5 TABLET ORAL DAILY
Qty: 30 TABLET | Refills: 0 | Status: SHIPPED | OUTPATIENT
Start: 2023-05-24

## 2023-06-06 DIAGNOSIS — F33.1 MODERATE EPISODE OF RECURRENT MAJOR DEPRESSIVE DISORDER: ICD-10-CM

## 2023-06-06 DIAGNOSIS — I10 HYPERTENSION, UNSPECIFIED TYPE: ICD-10-CM

## 2023-06-06 RX ORDER — BUPROPION HYDROCHLORIDE 150 MG/1
150 TABLET ORAL DAILY
Qty: 30 TABLET | Refills: 1 | Status: SHIPPED | OUTPATIENT
Start: 2023-06-06

## 2023-06-06 RX ORDER — HYDROCHLOROTHIAZIDE 12.5 MG/1
12.5 TABLET ORAL DAILY
Qty: 30 TABLET | Refills: 1 | Status: SHIPPED | OUTPATIENT
Start: 2023-06-06

## 2023-07-19 ENCOUNTER — OFFICE VISIT (OUTPATIENT)
Dept: FAMILY MEDICINE CLINIC | Age: 48
End: 2023-07-19
Payer: OTHER GOVERNMENT

## 2023-07-19 VITALS
HEART RATE: 84 BPM | SYSTOLIC BLOOD PRESSURE: 133 MMHG | WEIGHT: 171 LBS | OXYGEN SATURATION: 100 % | DIASTOLIC BLOOD PRESSURE: 89 MMHG | HEIGHT: 66 IN | BODY MASS INDEX: 27.48 KG/M2

## 2023-07-19 DIAGNOSIS — F41.0 PANIC ATTACKS: ICD-10-CM

## 2023-07-19 DIAGNOSIS — R53.83 OTHER FATIGUE: Primary | ICD-10-CM

## 2023-07-19 DIAGNOSIS — G47.30 SLEEP APNEA, UNSPECIFIED TYPE: ICD-10-CM

## 2023-07-19 DIAGNOSIS — M25.551 BILATERAL HIP PAIN: ICD-10-CM

## 2023-07-19 DIAGNOSIS — F33.1 MODERATE EPISODE OF RECURRENT MAJOR DEPRESSIVE DISORDER: ICD-10-CM

## 2023-07-19 DIAGNOSIS — J45.20 MILD INTERMITTENT ASTHMA, UNSPECIFIED WHETHER COMPLICATED: ICD-10-CM

## 2023-07-19 DIAGNOSIS — R92.8 ABNORMAL MAMMOGRAM: ICD-10-CM

## 2023-07-19 DIAGNOSIS — N92.6 IRREGULAR MENSTRUAL CYCLE: ICD-10-CM

## 2023-07-19 DIAGNOSIS — Z12.31 ENCOUNTER FOR SCREENING MAMMOGRAM FOR MALIGNANT NEOPLASM OF BREAST: ICD-10-CM

## 2023-07-19 DIAGNOSIS — M25.552 BILATERAL HIP PAIN: ICD-10-CM

## 2023-07-19 DIAGNOSIS — I10 HYPERTENSION, UNSPECIFIED TYPE: ICD-10-CM

## 2023-07-19 PROCEDURE — 99214 OFFICE O/P EST MOD 30 MIN: CPT | Performed by: NURSE PRACTITIONER

## 2023-07-19 RX ORDER — COPPER 313.4 MG/1
1 INTRAUTERINE DEVICE INTRAUTERINE ONCE
COMMUNITY

## 2023-07-19 RX ORDER — VENLAFAXINE HYDROCHLORIDE 37.5 MG/1
37.5 CAPSULE, EXTENDED RELEASE ORAL DAILY
Qty: 30 CAPSULE | Refills: 1 | Status: SHIPPED | OUTPATIENT
Start: 2023-07-19

## 2023-07-19 RX ORDER — HYDROCHLOROTHIAZIDE 12.5 MG/1
12.5 TABLET ORAL DAILY
Qty: 30 TABLET | Refills: 1 | Status: SHIPPED | OUTPATIENT
Start: 2023-07-19

## 2023-07-19 NOTE — PROGRESS NOTES
Chief Complaint  Ernestine Condon presents to [facility] for Follow-up (Wants to discuss blood work. ), Med Refill, Fatigue (No energy ), and Muscle Pain      Subjective     History of Present Illness  Ernestine is here today on some longstanding problems.  She reports that she has been having some extensive hip pain and wants to make sure there is no underlying cause of her symptoms.  She reports that she has also been having some extreme fatigue.Fatigue: Patient complains of fatigue. Symptoms began several months ago. Sentinal symptom the patient feels fatigue began with: significant change in weight, symptoms of arthritis, and change in menstrual cycle (missing a few months LMP 7/7. Symptoms of her fatigue have been feelings of depression and general malaise. Patient describes the following psychologic symptoms: .  Patient denies fever, unusual rashes, excessive menstrual bleeding, and witnessed or suspected sleep apnea. Symptoms have gradually worsened. Severity has been struggles to carry out day to day responsibilities..  She is interested in checking hormone levels although we discussed today that more than likely she is experiencing perimenopausal symptoms since she has missed a few months during her cycles.    She does not feel that her depression and anxiety medication is working for her.  She would like a change.  She is currently takingWellbutrin but wants to make sure that the new medication does not cause weight gain.        Assessment and Plan       Diagnoses and all orders for this visit:    1. Other fatigue (Primary)  Comments:  labs / sleep study if no indication from labs  Orders:  -     Comprehensive metabolic panel; Future  -     TSH Rfx On Abnormal To Free T4; Future  -     IVON by IFA, Reflex 9-biomarkers profile; Future  -     Sedimentation rate, automated; Future  -     CBC & Differential; Future    2. Moderate episode of recurrent major depressive  disorder  Comments:  medication change as discussed  follow up in 6-8 weeks  Orders:  -     venlafaxine XR (Effexor XR) 37.5 MG 24 hr capsule; Take 1 capsule by mouth Daily.  Dispense: 30 capsule; Refill: 1    3. Bilateral hip pain  Comments:  check arthritis labs  defer imaging today  consider if persists  Orders:  -     Comprehensive metabolic panel; Future  -     TSH Rfx On Abnormal To Free T4; Future  -     Sedimentation rate, automated; Future    4. Sleep apnea, unspecified type  -     Cancel: Ambulatory Referral to Sleep Medicine  -     Ambulatory Referral to Sleep Medicine    5. Hypertension, unspecified type  Comments:  I have advised her to check BP at home.  Follow-up sooner should blood pressure remain above 130/80 otherwise 6-month follow-up  Orders:  -     hydroCHLOROthiazide (HYDRODIURIL) 12.5 MG tablet; Take 1 tablet by mouth Daily.  Dispense: 30 tablet; Refill: 1    6. Panic attacks  Comments:  will switch medication to venlafaxine to see if improved- consider alternative  Orders:  -     venlafaxine XR (Effexor XR) 37.5 MG 24 hr capsule; Take 1 capsule by mouth Daily.  Dispense: 30 capsule; Refill: 1    7. Abnormal mammogram  Comments:  due for mammogram screening in September  order placed  last was normal    8. Irregular menstrual cycle  -     IVON by IFA, Reflex 9-biomarkers profile; Future  -     Estrogens, Total; Future  -     Luteinizing Hormone; Future  -     Follicle Stimulating Hormone; Future  -     Progesterone; Future  -     CBC & Differential; Future    9. Mild intermittent asthma, unspecified whether complicated  -     ProAir  (90 Base) MCG/ACT inhaler; Inhale 2 puffs As Needed for Shortness of Air or Wheezing.  Dispense: 18 g; Refill: 2    10. Encounter for screening mammogram for malignant neoplasm of breast  -     Mammo Screening Digital Tomosynthesis Bilateral With CAD; Future        Follow Up   Return for Pending lab results.      New Medications Ordered This Visit  "  Medications    venlafaxine XR (Effexor XR) 37.5 MG 24 hr capsule     Sig: Take 1 capsule by mouth Daily.     Dispense:  30 capsule     Refill:  1    hydroCHLOROthiazide (HYDRODIURIL) 12.5 MG tablet     Sig: Take 1 tablet by mouth Daily.     Dispense:  30 tablet     Refill:  1    ProAir  (90 Base) MCG/ACT inhaler     Sig: Inhale 2 puffs As Needed for Shortness of Air or Wheezing.     Dispense:  18 g     Refill:  2       Medications Discontinued During This Encounter   Medication Reason    buPROPion XL (WELLBUTRIN XL) 150 MG 24 hr tablet Alternate therapy    ProAir  (90 Base) MCG/ACT inhaler Reorder    hydroCHLOROthiazide (HYDRODIURIL) 12.5 MG tablet Reorder            Review of Systems   Constitutional:  Positive for fatigue (severe) and unexpected weight gain.   Musculoskeletal:  Positive for arthralgias (bilateral hips) and myalgias. Negative for joint swelling.   Neurological:  Negative for weakness and numbness.     Objective     Vitals:    07/19/23 1637   BP: 133/89   BP Location: Left arm   Patient Position: Sitting   Pulse: 84   SpO2: 100%   Weight: 77.6 kg (171 lb)   Height: 167.6 cm (65.98\")     Body mass index is 27.61 kg/m².     Physical Exam  Constitutional:       General: She is not in acute distress.     Appearance: Normal appearance.   HENT:      Head: Normocephalic.   Cardiovascular:      Rate and Rhythm: Normal rate and regular rhythm.   Pulmonary:      Effort: Pulmonary effort is normal.      Breath sounds: Normal breath sounds.   Musculoskeletal:         General: Normal range of motion.      Left hip: No tenderness, bony tenderness or crepitus. Normal range of motion.   Neurological:      General: No focal deficit present.      Mental Status: She is alert and oriented to person, place, and time.   Psychiatric:         Mood and Affect: Mood normal. Mood is depressed. Affect is flat.         Behavior: Behavior normal.          Result Review                       Allergies   Allergen " Reactions    Fluarix [Influenza Virus Vaccine] Other (See Comments)     Pt did not give allergy     Penicillins Unknown - High Severity    Barley Grass Rash    Eggs Or Egg-Derived Products Rash    Wheat Rash      Past Medical History:   Diagnosis Date    H/O:      History of appendectomy     History of carpal tunnel release      Current Outpatient Medications   Medication Sig Dispense Refill    EPINEPHrine (EPIPEN) 0.3 MG/0.3ML solution auto-injector injection USE AS DIRECTED FOR ACUTE ALLERGIC REACTION      hydroCHLOROthiazide (HYDRODIURIL) 12.5 MG tablet Take 1 tablet by mouth Daily. 30 tablet 1    ProAir  (90 Base) MCG/ACT inhaler Inhale 2 puffs As Needed for Shortness of Air or Wheezing. 18 g 2    Paragard Intrauterine Copper intrauterine device IUD 1 each by Intrauterine route 1 (One) Time.      venlafaxine XR (Effexor XR) 37.5 MG 24 hr capsule Take 1 capsule by mouth Daily. 30 capsule 1     No current facility-administered medications for this visit.     History reviewed. No pertinent surgical history.   Health Maintenance Due   Topic Date Due    COLORECTAL CANCER SCREENING  Never done    COVID-19 Vaccine (1) Never done    TDAP/TD VACCINES (1 - Tdap) Never done    HEPATITIS C SCREENING  Never done    ANNUAL PHYSICAL  Never done    ZOSTER VACCINE (1 of 2) Never done        There is no immunization history on file for this patient.      Part of this note may be an electronic transcription/translation of spoken language to printed   text using the Dragon Dictation System.      CHRYSTAL Johnson

## 2023-07-24 DIAGNOSIS — R53.83 FATIGUE, UNSPECIFIED TYPE: ICD-10-CM

## 2023-07-24 DIAGNOSIS — R76.8 POSITIVE ANA (ANTINUCLEAR ANTIBODY): Primary | ICD-10-CM

## 2023-07-24 DIAGNOSIS — M25.551 BILATERAL HIP PAIN: ICD-10-CM

## 2023-07-24 DIAGNOSIS — M25.552 BILATERAL HIP PAIN: ICD-10-CM

## 2023-08-06 DIAGNOSIS — F33.1 MODERATE EPISODE OF RECURRENT MAJOR DEPRESSIVE DISORDER: ICD-10-CM

## 2023-08-08 RX ORDER — BUPROPION HYDROCHLORIDE 150 MG/1
150 TABLET ORAL DAILY
Qty: 30 TABLET | Refills: 2 | OUTPATIENT
Start: 2023-08-08

## 2023-09-05 ENCOUNTER — OFFICE VISIT (OUTPATIENT)
Dept: FAMILY MEDICINE CLINIC | Age: 48
End: 2023-09-05
Payer: OTHER GOVERNMENT

## 2023-09-05 VITALS
TEMPERATURE: 98.1 F | WEIGHT: 167.6 LBS | DIASTOLIC BLOOD PRESSURE: 85 MMHG | OXYGEN SATURATION: 99 % | BODY MASS INDEX: 26.93 KG/M2 | SYSTOLIC BLOOD PRESSURE: 141 MMHG | HEIGHT: 66 IN | HEART RATE: 97 BPM

## 2023-09-05 DIAGNOSIS — E66.3 OVERWEIGHT: ICD-10-CM

## 2023-09-05 DIAGNOSIS — F33.1 MODERATE EPISODE OF RECURRENT MAJOR DEPRESSIVE DISORDER: Primary | ICD-10-CM

## 2023-09-05 DIAGNOSIS — Z12.11 SCREENING FOR COLON CANCER: ICD-10-CM

## 2023-09-05 DIAGNOSIS — I10 PRIMARY HYPERTENSION: ICD-10-CM

## 2023-09-05 DIAGNOSIS — G47.30 SLEEP APNEA, UNSPECIFIED TYPE: ICD-10-CM

## 2023-09-05 RX ORDER — SEMAGLUTIDE 0.5 MG/.5ML
0.5 INJECTION, SOLUTION SUBCUTANEOUS WEEKLY
Qty: 4 ML | Refills: 0 | Status: SHIPPED | OUTPATIENT
Start: 2023-09-05

## 2023-09-05 RX ORDER — VENLAFAXINE HYDROCHLORIDE 75 MG/1
75 CAPSULE, EXTENDED RELEASE ORAL DAILY
Qty: 90 CAPSULE | Refills: 1 | Status: SHIPPED | OUTPATIENT
Start: 2023-09-05

## 2023-09-05 NOTE — PROGRESS NOTES
Chief Complaint  Ernestine Condon presents to [facility] for Depression (Pt is due for colonoscopy /Follow up / labs completed 7/19 ), Panic Attack, and Hypertension      Subjective     History of Present Illness  Ernestine is here for follow up on depression. Current treatment includes venlafaxine 37.5 and has been taking current treatment at current dose for several weeks.   She reports that the current treatment is  working .  She does report some consitpation with the medication , but overall is working well.   She feels like there may be some room to improve and is interested in increasing the dse  She complains of the following side effects from the treatment: none.     Previous treatment includes medication. Wellbutrin    She did have a positive IVON and is scheduled for Rheumatology in December.      She also spoke with her [email or web address] and they do cover Wegovy with her comorbid conditions of HTN, sleep apnea.  She is not obese but is overweight with a BMI of 27.07.  she has lost 4 lbs since our last visit 7 weeks ago.        Assessment and Plan       Diagnoses and all orders for this visit:    1. Moderate episode of recurrent major depressive disorder (Primary)  Comments:  increase effexor to 75 mg daily, follow up 3 months  Orders:  -     venlafaxine XR (Effexor XR) 75 MG 24 hr capsule; Take 1 capsule by mouth Daily.  Dispense: 90 capsule; Refill: 1    2. Overweight  Comments:  Wegovy as discussed,  SE discussed, follow up in 3 months  stop if GI symptoms severe or if other symptoms develop  Orders:  -     Semaglutide-Weight Management 0.25 MG/0.5ML solution auto-injector; Inject 0.25 mg under the skin into the appropriate area as directed 1 (One) Time Per Week.  Dispense: 1 mL; Refill: 0  -     Semaglutide-Weight Management (Wegovy) 0.5 MG/0.5ML solution auto-injector; Inject 0.5 mL under the skin into the appropriate area as directed 1 (One) Time Per Week. To be  "started after 4 weeks on 0.25mg   *Pharmacy may sub for same dosing with any comparable  pen *  Dispense: 4 mL; Refill: 0    3. Screening for colon cancer  -     Ambulatory Referral For Screening Colonoscopy    4. Primary hypertension  Comments:  elevated today, may improve with weight loss    5. Sleep apnea, unspecified type  Comments:  may benefit from weight loss        BMI is >= 25 and <30. (Overweight) The following options were offered after discussion;: weight loss educational material (shared in after visit summary) and pharmacological intervention options       Follow Up   Return in about 3 months (around 12/5/2023) for Recheck.      New Medications Ordered This Visit   Medications    venlafaxine XR (Effexor XR) 75 MG 24 hr capsule     Sig: Take 1 capsule by mouth Daily.     Dispense:  90 capsule     Refill:  1    Semaglutide-Weight Management 0.25 MG/0.5ML solution auto-injector     Sig: Inject 0.25 mg under the skin into the appropriate area as directed 1 (One) Time Per Week.     Dispense:  1 mL     Refill:  0    Semaglutide-Weight Management (Wegovy) 0.5 MG/0.5ML solution auto-injector     Sig: Inject 0.5 mL under the skin into the appropriate area as directed 1 (One) Time Per Week. To be started after 4 weeks on 0.25mg   *Pharmacy may sub for same dosing with any comparable  pen *     Dispense:  4 mL     Refill:  0       Medications Discontinued During This Encounter   Medication Reason    venlafaxine XR (Effexor XR) 37.5 MG 24 hr capsule Dose adjustment            Review of Systems    Objective     Vitals:    09/05/23 1515   BP: 141/85   BP Location: Left arm   Patient Position: Sitting   Cuff Size: Adult   Pulse: 97   Temp: 98.1 °F (36.7 °C)   TempSrc: Oral   SpO2: 99%   Weight: 76 kg (167 lb 9.6 oz)   Height: 167.6 cm (65.98\")     Body mass index is 27.07 kg/m².     Physical Exam       Result Review                       Allergies   Allergen Reactions    Fluarix [Influenza Virus Vaccine] Other (See " Comments)     Pt did not give allergy     Penicillins Unknown - High Severity    Barley Grass Rash    Eggs Or Egg-Derived Products Rash    Wheat Rash      Past Medical History:   Diagnosis Date    H/O:      History of appendectomy     History of carpal tunnel release      Current Outpatient Medications   Medication Sig Dispense Refill    EPINEPHrine (EPIPEN) 0.3 MG/0.3ML solution auto-injector injection USE AS DIRECTED FOR ACUTE ALLERGIC REACTION      hydroCHLOROthiazide (HYDRODIURIL) 12.5 MG tablet Take 1 tablet by mouth Daily. 30 tablet 1    Paragard Intrauterine Copper intrauterine device IUD 1 each by Intrauterine route 1 (One) Time.      ProAir  (90 Base) MCG/ACT inhaler Inhale 2 puffs As Needed for Shortness of Air or Wheezing. 18 g 2    Semaglutide-Weight Management (Wegovy) 0.5 MG/0.5ML solution auto-injector Inject 0.5 mL under the skin into the appropriate area as directed 1 (One) Time Per Week. To be started after 4 weeks on 0.25mg   *Pharmacy may sub for same dosing with any comparable  pen * 4 mL 0    Semaglutide-Weight Management 0.25 MG/0.5ML solution auto-injector Inject 0.25 mg under the skin into the appropriate area as directed 1 (One) Time Per Week. 1 mL 0    venlafaxine XR (Effexor XR) 75 MG 24 hr capsule Take 1 capsule by mouth Daily. 90 capsule 1     No current facility-administered medications for this visit.     History reviewed. No pertinent surgical history.   Health Maintenance Due   Topic Date Due    COLORECTAL CANCER SCREENING  Never done    TDAP/TD VACCINES (1 - Tdap) Never done    HEPATITIS C SCREENING  Never done    ANNUAL PHYSICAL  Never done    ZOSTER VACCINE (1 of 2) Never done        There is no immunization history on file for this patient.      Part of this note may be an electronic transcription/translation of spoken language to printed   text using the Dragon Dictation System.      CHRYSTAL Johnson

## 2023-09-12 ENCOUNTER — PRIOR AUTHORIZATION (OUTPATIENT)
Dept: FAMILY MEDICINE CLINIC | Age: 48
End: 2023-09-12
Payer: OTHER GOVERNMENT

## 2023-09-12 NOTE — TELEPHONE ENCOUNTER
[facility]  Insurance requires that pt has tried and failed, or has a contraindication to ALL formulary alternatives: Phentermine, Qsymia, Contrave.

## 2023-09-19 ENCOUNTER — OFFICE VISIT (OUTPATIENT)
Dept: SURGERY | Facility: CLINIC | Age: 48
End: 2023-09-19
Payer: OTHER GOVERNMENT

## 2023-09-19 VITALS — BODY MASS INDEX: 26.71 KG/M2 | HEIGHT: 66 IN | RESPIRATION RATE: 18 BRPM | WEIGHT: 166.2 LBS

## 2023-09-19 DIAGNOSIS — Z12.11 ENCOUNTER FOR SCREENING COLONOSCOPY: Primary | ICD-10-CM

## 2023-09-19 PROCEDURE — S0260 H&P FOR SURGERY: HCPCS | Performed by: STUDENT IN AN ORGANIZED HEALTH CARE EDUCATION/TRAINING PROGRAM

## 2023-09-19 NOTE — PROGRESS NOTES
Patient Name:  Ernestine Condon  YOB: 1975  [id number]    Referring Provider: Irma Rouse APRN    Patient Care Team:  Irma Rouse APRN as PCP - General (Nurse Practitioner)      Chief Complaint  Advice Only (New Patient.Colon consult)    Subjective     Ernestine Condon is a 48 y.o. female who presents to [facility]    History of Present Illness  Patient presents as a new referral for a screening colonoscopy.  She is never undergone endoscopic evaluation with colonoscopy before.  She denies any family history of colorectal cancer, Crohn's disease, ulcerative colitis.  She has a history of night sweats but denies any recent fevers, chills, or unexplained weight loss.  She intermittently struggles with constipation secondary to her antidepressant medications, but denies any bloody bowel movements.    History     Past Medical History:   Diagnosis Date    H/O:      History of appendectomy     History of carpal tunnel release        History reviewed. No pertinent surgical history.    Family History   Problem Relation Age of Onset    Obesity Mother     Hypertension Mother     Diabetes Mother     Arthritis Mother     Coronary artery disease Father     Diabetes Father     Hypertension Father     Dementia Father     Heart attack Father         at age 74    Arthritis Maternal Grandmother     No Known Problems Other        Social History     Tobacco Use    Smoking status: Former     Packs/day: 0.25     Years: 10.00     Pack years: 2.50     Types: Cigarettes     Quit date: 2020     Years since quitting: 3.5    Smokeless tobacco: Never    Tobacco comments:     Vapes    Vaping Use    Vaping Use: Every day    Substances: Nicotine    Devices: Pre-filled or refillable cartridge   Substance Use Topics    Alcohol use: Yes     Comment: once in awhile    Drug use: Never       Allergies   Allergen Reactions    Fluarix [Influenza Virus Vaccine] Other (See  "Comments)     Pt did not give allergy     Penicillins Unknown - High Severity    Barley Grass Rash    Eggs Or Egg-Derived Products Rash    Wheat Rash       Prior to Admission medications    Medication Sig Start Date End Date Taking? Authorizing Provider   EPINEPHrine (EPIPEN) 0.3 MG/0.3ML solution auto-injector injection USE AS DIRECTED FOR ACUTE ALLERGIC REACTION 3/9/22  Yes ProviderBrian MD   hydroCHLOROthiazide (HYDRODIURIL) 12.5 MG tablet Take 1 tablet by mouth Daily. 7/19/23  Yes Irma Rouse APRN   Paragard Intrauterine Copper intrauterine device IUD 1 each by Intrauterine route 1 (One) Time.   Yes Provider, MD Brian   ProAir  (90 Base) MCG/ACT inhaler Inhale 2 puffs As Needed for Shortness of Air or Wheezing. 7/19/23  Yes Irma Rouse APRN   venlafaxine XR (Effexor XR) 75 MG 24 hr capsule Take 1 capsule by mouth Daily. 9/5/23  Yes Irma Rouse APRN   Semaglutide-Weight Management (Wegovy) 0.5 MG/0.5ML solution auto-injector Inject 0.5 mL under the skin into the appropriate area as directed 1 (One) Time Per Week. To be started after 4 weeks on 0.25mg   *Pharmacy may sub for same dosing with any comparable  pen *  Patient not taking: Reported on 9/19/2023 9/5/23   Irma Rouse APRN   Semaglutide-Weight Management 0.25 MG/0.5ML solution auto-injector Inject 0.25 mg under the skin into the appropriate area as directed 1 (One) Time Per Week.  Patient not taking: Reported on 9/19/2023 9/5/23   Irma Rouse APRN       Objective    Objective       Vital Signs:   Resp 18   Ht 167.6 cm (65.98\")   Wt 75.4 kg (166 lb 3.2 oz)   BMI 26.84 kg/m²       Physical Exam  Constitutional:       Appearance: Normal appearance.   HENT:      Head: Normocephalic and atraumatic.      Mouth/Throat:      Mouth: Mucous membranes are moist.      Pharynx: Oropharynx is clear.   Cardiovascular:      Rate and Rhythm: Normal rate and regular rhythm.   Pulmonary:      Effort: Pulmonary effort is " normal. No respiratory distress.   Abdominal:      General: There is no distension.      Palpations: Abdomen is soft.      Tenderness: There is no abdominal tenderness.   Musculoskeletal:         General: No swelling. Normal range of motion.      Cervical back: Normal range of motion and neck supple.   Skin:     General: Skin is warm and dry.   Neurological:      General: No focal deficit present.      Mental Status: She is alert and oriented to person, place, and time.   Psychiatric:         Mood and Affect: Mood normal.         Behavior: Behavior normal.              Assessment / Plan      Diagnoses and all orders for this visit:    1. Encounter for screening colonoscopy (Primary)    48-year-old female desires screening colonoscopy.  She would like this to be completed in 2024, but we unfortunately do not have dates that far out from today's visit to schedule.  She has been instructed to call the office closer to the start of the new year to schedule a screening colonoscopy with possible biopsy in January.  Did discuss prep with patient and she voiced understanding.    Follow Up   Return will schedule for screening colonoscopy in 2024.      Patient was given instructions and counseling regarding her condition or for health maintenance advice. Please see specific information pulled into the AVS if appropriate.     Electronically signed by Jermaine Street MD, 09/19/23, 2:36 PM EDT.

## 2023-09-20 DIAGNOSIS — F33.1 MODERATE EPISODE OF RECURRENT MAJOR DEPRESSIVE DISORDER: ICD-10-CM

## 2023-09-20 RX ORDER — VENLAFAXINE HYDROCHLORIDE 37.5 MG/1
37.5 CAPSULE, EXTENDED RELEASE ORAL DAILY
Qty: 90 CAPSULE | Refills: 1 | Status: SHIPPED | OUTPATIENT
Start: 2023-09-20

## 2023-10-13 ENCOUNTER — HOSPITAL ENCOUNTER (OUTPATIENT)
Dept: MAMMOGRAPHY | Facility: HOSPITAL | Age: 48
Discharge: HOME | End: 2023-10-13
Admitting: NURSE PRACTITIONER
Payer: OTHER GOVERNMENT

## 2023-10-13 DIAGNOSIS — Z12.31 ENCOUNTER FOR SCREENING MAMMOGRAM FOR MALIGNANT NEOPLASM OF BREAST: ICD-10-CM

## 2023-10-13 PROCEDURE — 77063 BREAST TOMOSYNTHESIS BI: CPT

## 2023-10-13 PROCEDURE — 77067 SCR MAMMO BI INCL CAD: CPT

## 2023-12-20 ENCOUNTER — TELEPHONE (OUTPATIENT)
Dept: FAMILY MEDICINE CLINIC | Age: 48
End: 2023-12-20
Payer: OTHER GOVERNMENT

## 2023-12-20 DIAGNOSIS — I10 HYPERTENSION, UNSPECIFIED TYPE: ICD-10-CM

## 2023-12-20 RX ORDER — HYDROCHLOROTHIAZIDE 12.5 MG/1
12.5 TABLET ORAL DAILY
Qty: 30 TABLET | Refills: 0 | Status: SHIPPED | OUTPATIENT
Start: 2023-12-20

## 2023-12-20 NOTE — TELEPHONE ENCOUNTER
Patient returned call to Gaby, but said that she stopped taking medication and started taking a different medication, so she didn't need a re-check. Transferred call to Lroi since she said she needed a refill on the new medication.

## 2024-01-16 ENCOUNTER — OFFICE VISIT (OUTPATIENT)
Dept: FAMILY MEDICINE CLINIC | Age: 49
End: 2024-01-16
Payer: OTHER GOVERNMENT

## 2024-01-16 VITALS
WEIGHT: 177.2 LBS | HEART RATE: 86 BPM | OXYGEN SATURATION: 98 % | SYSTOLIC BLOOD PRESSURE: 128 MMHG | HEIGHT: 66 IN | TEMPERATURE: 98.1 F | BODY MASS INDEX: 28.48 KG/M2 | DIASTOLIC BLOOD PRESSURE: 82 MMHG

## 2024-01-16 DIAGNOSIS — I10 HYPERTENSION, UNSPECIFIED TYPE: ICD-10-CM

## 2024-01-16 DIAGNOSIS — J45.20 MILD INTERMITTENT ASTHMA, UNSPECIFIED WHETHER COMPLICATED: ICD-10-CM

## 2024-01-16 DIAGNOSIS — F33.1 MODERATE EPISODE OF RECURRENT MAJOR DEPRESSIVE DISORDER: ICD-10-CM

## 2024-01-16 PROCEDURE — 99214 OFFICE O/P EST MOD 30 MIN: CPT | Performed by: NURSE PRACTITIONER

## 2024-01-16 RX ORDER — VENLAFAXINE HYDROCHLORIDE 37.5 MG/1
37.5 CAPSULE, EXTENDED RELEASE ORAL DAILY
Qty: 90 CAPSULE | Refills: 1 | Status: SHIPPED | OUTPATIENT
Start: 2024-01-16

## 2024-01-16 RX ORDER — HYDROCHLOROTHIAZIDE 12.5 MG/1
12.5 TABLET ORAL DAILY
Qty: 90 TABLET | Refills: 1 | Status: SHIPPED | OUTPATIENT
Start: 2024-01-16

## 2024-01-16 RX ORDER — MELOXICAM 7.5 MG/1
7.5 TABLET ORAL DAILY
COMMUNITY

## 2024-01-16 RX ORDER — OMEPRAZOLE 20 MG/1
20 CAPSULE, DELAYED RELEASE ORAL DAILY
COMMUNITY
Start: 2023-12-19

## 2024-01-16 NOTE — PROGRESS NOTES
Chief Complaint  Ernestine Condon presents to [facility] for Hypertension (3 mo. F/U )      Subjective     History of Present Illness  Ernestine presents today for follow up on Hypertension.    Current medication / treatment includes hydrochlorothiazide.    Reported as home BP checks: not checked  Cardiac symptoms none.  The 10-year ASCVD risk score (Sindy CHANCE, et al., 2019) is: 1.4%    Values used to calculate the score:      Age: 48 years      Sex: Female      Is Non- : No      Diabetic: No      Tobacco smoker: No      Systolic Blood Pressure: 128 mmHg      Is BP treated: Yes      HDL Cholesterol: 63 mg/dL      Total Cholesterol: 197 mg/dL    She will be scheduling her colonoscopy this month as she has already had her pre-consultation visit      Ernestine is here for follow up on depression.  She is reporting that her medication is working well without side effects.  Current treatment includes  Effexor 37.5 mg daily  .     We tried Wegovy prescription however, her insurance would not covid  She will reach out to them again to see if any qualifiers    She does have a history of asthma- generally seasonal flare ups- has not used inhaler in some time  no need for refills but will call if needed       Assessment and Plan       Diagnoses and all orders for this visit:    1. Moderate episode of recurrent major depressive disorder  Comments:  continue Effexor at current dose and follow up in 6 months  Orders:  -     venlafaxine XR (Effexor XR) 37.5 MG 24 hr capsule; Take 1 capsule by mouth Daily.  Dispense: 90 capsule; Refill: 1    2. Hypertension, unspecified type  Comments:  continue current treatment and follow up in 6 months  Orders:  -     hydroCHLOROthiazide (HYDRODIURIL) 12.5 MG tablet; Take 1 tablet by mouth Daily.  Dispense: 90 tablet; Refill: 1    3. Mild intermittent asthma, unspecified whether complicated  Comments:  she will call should she need any refills  "on her inhaler            Follow Up   Return in about 6 months (around 7/16/2024) for Recheck, Annual physical.  Future Appointments   Date Time Provider Department Center   7/17/2024  2:15 PM Irma Rouse APRN [facility]       New Medications Ordered This Visit   Medications    venlafaxine XR (Effexor XR) 37.5 MG 24 hr capsule     Sig: Take 1 capsule by mouth Daily.     Dispense:  90 capsule     Refill:  1    hydroCHLOROthiazide (HYDRODIURIL) 12.5 MG tablet     Sig: Take 1 tablet by mouth Daily.     Dispense:  90 tablet     Refill:  1       Medications Discontinued During This Encounter   Medication Reason    Semaglutide-Weight Management 0.25 MG/0.5ML solution auto-injector Duplicate order    Semaglutide-Weight Management (Wegovy) 0.5 MG/0.5ML solution auto-injector Non-compliance    venlafaxine XR (Effexor XR) 37.5 MG 24 hr capsule Reorder    hydroCHLOROthiazide (HYDRODIURIL) 12.5 MG tablet Reorder          Review of Systems    Objective     Vitals:    01/16/24 1621 01/16/24 1637   BP: 146/86 128/82   BP Location: Left arm Right arm   Patient Position: Sitting    Cuff Size: Adult    Pulse: 86    Temp: 98.1 °F (36.7 °C)    TempSrc: Oral    SpO2: 98%    Weight: 80.4 kg (177 lb 3.2 oz)    Height: 167.6 cm (65.98\")      Body mass index is 28.62 kg/m².          Physical Exam  Constitutional:       General: She is not in acute distress.     Appearance: Normal appearance.   HENT:      Head: Normocephalic.   Cardiovascular:      Rate and Rhythm: Normal rate and regular rhythm.   Pulmonary:      Effort: Pulmonary effort is normal.      Breath sounds: Normal breath sounds.   Musculoskeletal:         General: Normal range of motion.   Neurological:      General: No focal deficit present.      Mental Status: She is alert and oriented to person, place, and time.   Psychiatric:         Mood and Affect: Mood normal.         Behavior: Behavior normal.            Result Review               Allergies   Allergen " Reactions    Fluarix [Influenza Virus Vaccine] Other (See Comments)     Pt did not give allergy     Penicillins Unknown - High Severity    Barley Grass Rash    Eggs Or Egg-Derived Products Rash    Wheat Rash      Past Medical History:   Diagnosis Date    H/O:      History of appendectomy     History of carpal tunnel release      Current Outpatient Medications   Medication Sig Dispense Refill    EPINEPHrine (EPIPEN) 0.3 MG/0.3ML solution auto-injector injection USE AS DIRECTED FOR ACUTE ALLERGIC REACTION      hydroCHLOROthiazide (HYDRODIURIL) 12.5 MG tablet Take 1 tablet by mouth Daily. 90 tablet 1    meloxicam (MOBIC) 7.5 MG tablet Take 1 tablet by mouth Daily.      omeprazole (priLOSEC) 20 MG capsule Take 1 capsule by mouth Daily.      Paragard Intrauterine Copper intrauterine device IUD 1 each by Intrauterine route 1 (One) Time.      ProAir  (90 Base) MCG/ACT inhaler Inhale 2 puffs As Needed for Shortness of Air or Wheezing. 18 g 2    venlafaxine XR (Effexor XR) 37.5 MG 24 hr capsule Take 1 capsule by mouth Daily. 90 capsule 1     No current facility-administered medications for this visit.     History reviewed. No pertinent surgical history.   Health Maintenance Due   Topic Date Due    COLORECTAL CANCER SCREENING  Never done    TDAP/TD VACCINES (1 - Tdap) Never done    HEPATITIS C SCREENING  Never done    ANNUAL PHYSICAL  Never done        There is no immunization history on file for this patient.      Part of this note may be an electronic transcription/translation of spoken language to printed   text using the Dragon Dictation System.      CHRYSTAL Johnson

## 2024-04-23 ENCOUNTER — OFFICE VISIT (OUTPATIENT)
Dept: FAMILY MEDICINE CLINIC | Age: 49
End: 2024-04-23
Payer: OTHER GOVERNMENT

## 2024-04-23 ENCOUNTER — LAB (OUTPATIENT)
Dept: LAB | Facility: HOSPITAL | Age: 49
End: 2024-04-23
Payer: OTHER GOVERNMENT

## 2024-04-23 VITALS
HEART RATE: 93 BPM | DIASTOLIC BLOOD PRESSURE: 82 MMHG | OXYGEN SATURATION: 100 % | WEIGHT: 179 LBS | BODY MASS INDEX: 28.77 KG/M2 | HEIGHT: 66 IN | SYSTOLIC BLOOD PRESSURE: 136 MMHG

## 2024-04-23 DIAGNOSIS — N92.6 MENSTRUAL BLEEDING PROBLEM: Primary | ICD-10-CM

## 2024-04-23 DIAGNOSIS — N92.6 MENSTRUAL BLEEDING PROBLEM: ICD-10-CM

## 2024-04-23 LAB
BASOPHILS # BLD AUTO: 0.04 10*3/MM3 (ref 0–0.2)
BASOPHILS NFR BLD AUTO: 0.4 % (ref 0–1.5)
DEPRECATED RDW RBC AUTO: 47.7 FL (ref 37–54)
EOSINOPHIL # BLD AUTO: 0.1 10*3/MM3 (ref 0–0.4)
EOSINOPHIL NFR BLD AUTO: 1.1 % (ref 0.3–6.2)
ERYTHROCYTE [DISTWIDTH] IN BLOOD BY AUTOMATED COUNT: 13.9 % (ref 12.3–15.4)
FSH SERPL-ACNC: 13.5 MIU/ML
HCT VFR BLD AUTO: 37.1 % (ref 34–46.6)
HGB BLD-MCNC: 12.3 G/DL (ref 12–15.9)
IMM GRANULOCYTES # BLD AUTO: 0.01 10*3/MM3 (ref 0–0.05)
IMM GRANULOCYTES NFR BLD AUTO: 0.1 % (ref 0–0.5)
LH SERPL-ACNC: 4.93 MIU/ML
LYMPHOCYTES # BLD AUTO: 2.67 10*3/MM3 (ref 0.7–3.1)
LYMPHOCYTES NFR BLD AUTO: 29.8 % (ref 19.6–45.3)
MCH RBC QN AUTO: 30.9 PG (ref 26.6–33)
MCHC RBC AUTO-ENTMCNC: 33.2 G/DL (ref 31.5–35.7)
MCV RBC AUTO: 93.2 FL (ref 79–97)
MONOCYTES # BLD AUTO: 0.63 10*3/MM3 (ref 0.1–0.9)
MONOCYTES NFR BLD AUTO: 7 % (ref 5–12)
NEUTROPHILS NFR BLD AUTO: 5.51 10*3/MM3 (ref 1.7–7)
NEUTROPHILS NFR BLD AUTO: 61.6 % (ref 42.7–76)
PLATELET # BLD AUTO: 368 10*3/MM3 (ref 140–450)
PMV BLD AUTO: 9.5 FL (ref 6–12)
PROGEST SERPL-MCNC: 0.25 NG/ML
RBC # BLD AUTO: 3.98 10*6/MM3 (ref 3.77–5.28)
TSH SERPL DL<=0.05 MIU/L-ACNC: 1.25 UIU/ML (ref 0.27–4.2)
WBC NRBC COR # BLD AUTO: 8.96 10*3/MM3 (ref 3.4–10.8)

## 2024-04-23 PROCEDURE — 99213 OFFICE O/P EST LOW 20 MIN: CPT | Performed by: NURSE PRACTITIONER

## 2024-04-23 PROCEDURE — 83002 ASSAY OF GONADOTROPIN (LH): CPT

## 2024-04-23 PROCEDURE — 82672 ASSAY OF ESTROGEN: CPT

## 2024-04-23 PROCEDURE — 84443 ASSAY THYROID STIM HORMONE: CPT

## 2024-04-23 PROCEDURE — 85025 COMPLETE CBC W/AUTO DIFF WBC: CPT

## 2024-04-23 PROCEDURE — 83001 ASSAY OF GONADOTROPIN (FSH): CPT

## 2024-04-23 PROCEDURE — 84144 ASSAY OF PROGESTERONE: CPT

## 2024-04-23 PROCEDURE — 36415 COLL VENOUS BLD VENIPUNCTURE: CPT

## 2024-04-23 NOTE — PROGRESS NOTES
"Chief Complaint  abnormal period (Patient c/o period for 45 days straight )    Subjective          Ernestine Condon presents to [facility]     Patient is a 48-year-old female who is here today for irregular menstrual bleeding.  Has a ParaGard IUD that was inserted in [place] approximately 2019.  Last Pap with negative result November 2022.  Labs and hormone levels done recently July 19 which showed she was not menopausal.  Patient states she had no menstrual cycle in February then had a regular cycle March 11 then had small amount of bleeding each day until this past Saturday when flow increased again.  She is changed 3 tampons so far today.  Mild menstrual cramping.     Objective   Vital Signs:   Vitals:    04/23/24 1255   BP: 136/82   BP Location: Left arm   Patient Position: Sitting   Cuff Size: Adult   Pulse: 93   SpO2: 100%   Weight: 81.2 kg (179 lb)   Height: 167.6 cm (65.98\")       Wt Readings from Last 3 Encounters:   04/23/24 81.2 kg (179 lb)   01/16/24 80.4 kg (177 lb 3.2 oz)   09/19/23 75.4 kg (166 lb 3.2 oz)      BP Readings from Last 3 Encounters:   04/23/24 136/82   01/16/24 128/82   09/05/23 141/85       Body mass index is 28.91 kg/m².             Physical Exam  Vitals reviewed.   Constitutional:       General: She is not in acute distress.     Appearance: Normal appearance. She is well-developed.   Cardiovascular:      Rate and Rhythm: Normal rate and regular rhythm.      Heart sounds: Normal heart sounds.   Pulmonary:      Effort: Pulmonary effort is normal.      Breath sounds: Normal breath sounds.   Abdominal:      General: Abdomen is flat. Bowel sounds are normal. There is no distension.      Palpations: Abdomen is soft. There is no mass.      Tenderness: There is no abdominal tenderness. There is no guarding or rebound.   Musculoskeletal:      Right lower leg: No edema.      Left lower leg: No edema.   Skin:     General: Skin is warm and dry.   Neurological: "      General: No focal deficit present.      Mental Status: She is alert.   Psychiatric:         Attention and Perception: Attention normal.         Mood and Affect: Mood and affect normal.         Behavior: Behavior normal.           Current Outpatient Medications:     EPINEPHrine (EPIPEN) 0.3 MG/0.3ML solution auto-injector injection, USE AS DIRECTED FOR ACUTE ALLERGIC REACTION, Disp: , Rfl:     hydroCHLOROthiazide (HYDRODIURIL) 12.5 MG tablet, Take 1 tablet by mouth Daily., Disp: 90 tablet, Rfl: 1    meloxicam (MOBIC) 7.5 MG tablet, Take 1 tablet by mouth Daily., Disp: , Rfl:     omeprazole (priLOSEC) 20 MG capsule, Take 1 capsule by mouth Daily., Disp: , Rfl:     Paragard Intrauterine Copper intrauterine device IUD, 1 each by Intrauterine route 1 (One) Time., Disp: , Rfl:     ProAir  (90 Base) MCG/ACT inhaler, Inhale 2 puffs As Needed for Shortness of Air or Wheezing., Disp: 18 g, Rfl: 2    venlafaxine XR (Effexor XR) 37.5 MG 24 hr capsule, Take 1 capsule by mouth Daily., Disp: 90 capsule, Rfl: 1   Past Medical History:   Diagnosis Date    H/O:      History of appendectomy     History of carpal tunnel release      Allergies   Allergen Reactions    Fluarix [Influenza Virus Vaccine] Other (See Comments)     Pt did not give allergy     Penicillins Unknown - High Severity    Barley Grass Rash    Egg-Derived Products Rash    Wheat Rash               Result Review :     Common labs          2023    17:29   Common Labs   Glucose 97    BUN 17    Creatinine 0.96    Sodium 136    Potassium 3.9    Chloride 100    Calcium 9.8    Albumin 4.7    Total Bilirubin 0.3    Alkaline Phosphatase 87    AST (SGOT) 16    ALT (SGPT) 18    WBC 10.46    Hemoglobin 14.2    Hematocrit 41.8    Platelets 325         No Images in the past 120 days found..           Social History     Tobacco Use   Smoking Status Former    Current packs/day: 0.00    Average packs/day: 0.3 packs/day for 10.0 years (2.5 ttl pk-yrs)    Types:  Cigarettes    Start date: 2010    Quit date: 2020    Years since quittin.1   Smokeless Tobacco Never   Tobacco Comments    Vapes            Assessment and Plan    Diagnoses and all orders for this visit:    1. Menstrual bleeding problem (Primary)  Assessment & Plan:  To emergency room if saturating a tampon or pad every hour.  Will check thyroid function and hormone levels for comparison to baseline levels.  Numbers are provided to 3 local gynecologists.  I recommend she call make an appointment to discuss abnormal uterine bleeding with gynecology.  Patient verbalized understanding.  Further treatment recommendations will pend lab results.    Orders:  -     TSH Rfx On Abnormal To Free T4; Future  -     CBC w AUTO Differential; Future  -     FSH & LH; Future  -     Estrogens, Total; Future  -     Progesterone; Future        Follow Up    No follow-ups on file.  Patient was given instructions and counseling regarding her condition or for health maintenance advice. Please see specific information pulled into the AVS if appropriate.

## 2024-04-23 NOTE — ASSESSMENT & PLAN NOTE
To emergency room if saturating a tampon or pad every hour.  Will check thyroid function and hormone levels for comparison to baseline levels.  Numbers are provided to 3 local gynecologists.  I recommend she call make an appointment to discuss abnormal uterine bleeding with gynecology.  Patient verbalized understanding.  Further treatment recommendations will pend lab results.

## 2024-04-28 LAB — ESTROGEN SERPL-MCNC: 141 PG/ML

## 2024-07-17 ENCOUNTER — OFFICE VISIT (OUTPATIENT)
Dept: FAMILY MEDICINE CLINIC | Age: 49
End: 2024-07-17
Payer: OTHER GOVERNMENT

## 2024-07-17 VITALS
HEART RATE: 102 BPM | SYSTOLIC BLOOD PRESSURE: 125 MMHG | OXYGEN SATURATION: 98 % | TEMPERATURE: 97.8 F | BODY MASS INDEX: 28.19 KG/M2 | DIASTOLIC BLOOD PRESSURE: 80 MMHG | HEIGHT: 66 IN | WEIGHT: 175.4 LBS

## 2024-07-17 DIAGNOSIS — Z12.31 SCREENING MAMMOGRAM FOR BREAST CANCER: ICD-10-CM

## 2024-07-17 DIAGNOSIS — K21.9 GASTROESOPHAGEAL REFLUX DISEASE, UNSPECIFIED WHETHER ESOPHAGITIS PRESENT: ICD-10-CM

## 2024-07-17 DIAGNOSIS — I10 HYPERTENSION, UNSPECIFIED TYPE: ICD-10-CM

## 2024-07-17 DIAGNOSIS — F33.1 MODERATE EPISODE OF RECURRENT MAJOR DEPRESSIVE DISORDER: ICD-10-CM

## 2024-07-17 DIAGNOSIS — Z00.00 ROUTINE GENERAL MEDICAL EXAMINATION AT A HEALTH CARE FACILITY: Primary | ICD-10-CM

## 2024-07-17 PROCEDURE — 99396 PREV VISIT EST AGE 40-64: CPT | Performed by: NURSE PRACTITIONER

## 2024-07-17 RX ORDER — VENLAFAXINE HYDROCHLORIDE 37.5 MG/1
37.5 CAPSULE, EXTENDED RELEASE ORAL DAILY
Qty: 90 CAPSULE | Refills: 1 | Status: SHIPPED | OUTPATIENT
Start: 2024-07-17

## 2024-07-17 RX ORDER — HYDROCHLOROTHIAZIDE 12.5 MG/1
12.5 TABLET ORAL DAILY
Qty: 90 TABLET | Refills: 1 | Status: SHIPPED | OUTPATIENT
Start: 2024-07-17

## 2024-07-17 RX ORDER — OMEPRAZOLE 20 MG/1
20 CAPSULE, DELAYED RELEASE ORAL DAILY
Qty: 90 CAPSULE | Refills: 1 | Status: SHIPPED | OUTPATIENT
Start: 2024-07-17

## 2024-07-17 RX ORDER — PHENTERMINE HYDROCHLORIDE 37.5 MG/1
37.5 TABLET ORAL
COMMUNITY
Start: 2024-06-13

## 2024-07-17 NOTE — PROGRESS NOTES
Chief Complaint  Ernestine Condon presents to [facility] for Annual Exam, Depression (refills), Hypertension (refills), and GI Problem (GERD  refills )      Subjective     History of Present Illness  Ernestine is here today for annual exam.   Last annual exam was 1 year  Last eye exam: 6 months  PROVIDER: [facility]   Last dental exam:   2 weeks    PROVIDER:  [facility]  Last menstrual period:  3 weeks ago.  Will have occasional missed period  Last Completed Pap Smear            PAP SMEAR (Every 3 Years) Next due on 11/22/2025 11/22/2022  IgP, Aptima HPV    02/23/2020  Done    01/01/2019  Patient-Reported (Performed Externally)                  Last Completed Mammogram       This patient has no relevant Health Maintenance data.            Diet / exercise:   Other: high protein and low carb  Patient's Body mass index is 28.32 kg/m². indicating that she is overweight (BMI 25-29.9).    The 10-year ASCVD risk score (Sindy CHANCE, et al., 2019) is: 1.5%    Values used to calculate the score:      Age: 49 years      Sex: Female      Is Non- : No      Diabetic: No      Tobacco smoker: No      Systolic Blood Pressure: 125 mmHg      Is BP treated: Yes      HDL Cholesterol: 63 mg/dL      Total Cholesterol: 197 mg/dL     Ernestine Condon DECLINES THE FOLLOWING HEALTH MAINTENANCE RECOMMENDATIONS DISCUSSED TODAY:  >Mammogram and >Colonoscopy       Patient Care Team:  Irma Rouse APRN as PCP - General (Nurse Practitioner)  Caridad Pierson MD as Consulting Physician (Rheumatology)   Ernestine presents today for follow up on Hypertension.    Current medication / treatment includes hydrochlorothiazide, and is compliant with medications.   no side effects reported.    Home blood pressure monitoring readings reported as home BP checks: not checked  Medication changes are not recommended at this time.    Ernestine presents today for follow up on  Depression   She reports that She Is doing well on current treatment of Effexor  She denies current suicidal and homicidal ideation.    Current psychotherapy : No    Taking omeprazole for GERD  doing well oncurrent treatment        Assessment and Plan     -well balanced diet and exercise as tolerated  -annual wellness exams are recommended  -health care maintenance and gaps in care discussed and orders have been placed as necessary and as willing by the patient.     Diagnoses and all orders for this visit:    1. Routine general medical examination at a health care facility (Primary)    2. Screening mammogram for breast cancer  -     Mammo Screening Digital Tomosynthesis Bilateral With CAD; Future    3. Hypertension, unspecified type  Comments:  continue current treatment and follow up in 6 months  Orders:  -     hydroCHLOROthiazide 12.5 MG tablet; Take 1 tablet by mouth Daily.  Dispense: 90 tablet; Refill: 1    4. Moderate episode of recurrent major depressive disorder  Comments:  continue Effexor at current dose and follow up in 6 months  Orders:  -     venlafaxine XR (Effexor XR) 37.5 MG 24 hr capsule; Take 1 capsule by mouth Daily.  Dispense: 90 capsule; Refill: 1    5. Gastroesophageal reflux disease, unspecified whether esophagitis present  -     omeprazole (priLOSEC) 20 MG capsule; Take 1 capsule by mouth Daily.  Dispense: 90 capsule; Refill: 1             Follow Up   Return in about 6 months (around 1/17/2025) for Recheck.  Future Appointments   Date Time Provider Department Center   1/7/2025  2:45 PM Irma Rouse APRN [facility]       New Medications Ordered This Visit   Medications    hydroCHLOROthiazide 12.5 MG tablet     Sig: Take 1 tablet by mouth Daily.     Dispense:  90 tablet     Refill:  1    venlafaxine XR (Effexor XR) 37.5 MG 24 hr capsule     Sig: Take 1 capsule by mouth Daily.     Dispense:  90 capsule     Refill:  1    omeprazole (priLOSEC) 20 MG capsule     Sig: Take 1 capsule by  "mouth Daily.     Dispense:  90 capsule     Refill:  1       Medications Discontinued During This Encounter   Medication Reason    omeprazole (priLOSEC) 20 MG capsule Patient Reported Not Taking    meloxicam (MOBIC) 7.5 MG tablet *Therapy completed    venlafaxine XR (Effexor XR) 37.5 MG 24 hr capsule Reorder    hydroCHLOROthiazide (HYDRODIURIL) 12.5 MG tablet Reorder            Review of Systems    Objective     Vitals:    07/17/24 1419   BP: 125/80   BP Location: Left arm   Patient Position: Sitting   Pulse: 102   Temp: 97.8 °F (36.6 °C)   TempSrc: Oral   SpO2: 98%   Weight: 79.6 kg (175 lb 6.4 oz)   Height: 167.6 cm (65.98\")     Body mass index is 28.32 kg/m².            Physical Exam  Vitals reviewed.   Constitutional:       Appearance: Normal appearance. She is well-developed. She is not ill-appearing.   HENT:      Head: Normocephalic and atraumatic.      Right Ear: Tympanic membrane, ear canal and external ear normal.      Left Ear: Tympanic membrane, ear canal and external ear normal.      Mouth/Throat:      Lips: Pink.      Mouth: Mucous membranes are moist.      Pharynx: Oropharynx is clear. Uvula midline. No oropharyngeal exudate.   Eyes:      Extraocular Movements: Extraocular movements intact.      Conjunctiva/sclera: Conjunctivae normal.      Pupils: Pupils are equal, round, and reactive to light.   Neck:      Thyroid: No thyromegaly.   Cardiovascular:      Rate and Rhythm: Normal rate and regular rhythm.      Heart sounds: No murmur heard.     No friction rub. No gallop.   Pulmonary:      Effort: Pulmonary effort is normal.      Breath sounds: Normal breath sounds. No wheezing or rhonchi.   Abdominal:      General: Bowel sounds are normal. There is no distension.      Palpations: Abdomen is soft.      Tenderness: There is no abdominal tenderness.   Musculoskeletal:      Cervical back: Normal range of motion.   Lymphadenopathy:      Head:      Right side of head: No submandibular adenopathy.      Left " side of head: No submandibular adenopathy.      Cervical: No cervical adenopathy.   Skin:     General: Skin is warm and dry.      Findings: No lesion or rash.   Neurological:      Mental Status: She is alert and oriented to person, place, and time.      Cranial Nerves: No cranial nerve deficit.      Gait: Gait is intact.   Psychiatric:         Mood and Affect: Mood and affect normal.         Behavior: Behavior normal.         Thought Content: Thought content normal.         Judgment: Judgment normal.            Result Review                       Allergies   Allergen Reactions    Fluarix [Influenza Virus Vaccine] Other (See Comments)     Pt did not give allergy     Penicillins Unknown - High Severity    Barley Grass Rash    Egg-Derived Products Rash    Wheat Rash      Past Medical History:   Diagnosis Date    H/O:      History of appendectomy     History of carpal tunnel release      Current Outpatient Medications   Medication Sig Dispense Refill    EPINEPHrine (EPIPEN) 0.3 MG/0.3ML solution auto-injector injection USE AS DIRECTED FOR ACUTE ALLERGIC REACTION      hydroCHLOROthiazide 12.5 MG tablet Take 1 tablet by mouth Daily. 90 tablet 1    omeprazole (priLOSEC) 20 MG capsule Take 1 capsule by mouth Daily. 90 capsule 1    Paragard Intrauterine Copper intrauterine device IUD To be inserted one time by prescriber. Route intrauterine.      phentermine (ADIPEX-P) 37.5 MG tablet Take 1 tablet by mouth Every Morning Before Breakfast.      ProAir  (90 Base) MCG/ACT inhaler Inhale 2 puffs As Needed for Shortness of Air or Wheezing. 18 g 2    venlafaxine XR (Effexor XR) 37.5 MG 24 hr capsule Take 1 capsule by mouth Daily. 90 capsule 1     No current facility-administered medications for this visit.     History reviewed. No pertinent surgical history.   Health Maintenance Due   Topic Date Due    COLORECTAL CANCER SCREENING  Never done    HEPATITIS C SCREENING  Never done    ZOSTER VACCINE (1 of 2) Never done     MAMMOGRAM  10/13/2024        There is no immunization history on file for this patient.      Part of this note may be an electronic transcription/translation of spoken language to printed   text using the Dragon Dictation System.      CHRYSTAL Johnson

## 2024-10-15 DIAGNOSIS — F33.1 MODERATE EPISODE OF RECURRENT MAJOR DEPRESSIVE DISORDER: ICD-10-CM

## 2024-10-16 RX ORDER — VENLAFAXINE HYDROCHLORIDE 37.5 MG/1
37.5 CAPSULE, EXTENDED RELEASE ORAL DAILY
Qty: 90 CAPSULE | Refills: 1 | OUTPATIENT
Start: 2024-10-16

## 2024-12-16 ENCOUNTER — HOSPITAL ENCOUNTER (OUTPATIENT)
Dept: MAMMOGRAPHY | Facility: HOSPITAL | Age: 49
Discharge: HOME | End: 2024-12-16
Admitting: NURSE PRACTITIONER
Payer: OTHER GOVERNMENT

## 2024-12-16 DIAGNOSIS — Z12.31 SCREENING MAMMOGRAM FOR BREAST CANCER: ICD-10-CM

## 2024-12-16 PROCEDURE — 77067 SCR MAMMO BI INCL CAD: CPT

## 2024-12-16 PROCEDURE — 77063 BREAST TOMOSYNTHESIS BI: CPT

## 2025-01-16 DIAGNOSIS — K21.9 GASTROESOPHAGEAL REFLUX DISEASE, UNSPECIFIED WHETHER ESOPHAGITIS PRESENT: ICD-10-CM

## 2025-02-12 ENCOUNTER — OFFICE VISIT (OUTPATIENT)
Dept: FAMILY MEDICINE CLINIC | Age: 50
End: 2025-02-12
Payer: OTHER GOVERNMENT

## 2025-02-12 VITALS
WEIGHT: 167.4 LBS | TEMPERATURE: 97.5 F | DIASTOLIC BLOOD PRESSURE: 88 MMHG | BODY MASS INDEX: 26.9 KG/M2 | HEART RATE: 88 BPM | HEIGHT: 66 IN | OXYGEN SATURATION: 100 % | SYSTOLIC BLOOD PRESSURE: 144 MMHG

## 2025-02-12 DIAGNOSIS — M25.511 CHRONIC RIGHT SHOULDER PAIN: ICD-10-CM

## 2025-02-12 DIAGNOSIS — Z13.6 SCREENING FOR CARDIOVASCULAR CONDITION: ICD-10-CM

## 2025-02-12 DIAGNOSIS — I10 HYPERTENSION, UNSPECIFIED TYPE: ICD-10-CM

## 2025-02-12 DIAGNOSIS — T30.0 BURN: Primary | ICD-10-CM

## 2025-02-12 DIAGNOSIS — K21.9 GASTROESOPHAGEAL REFLUX DISEASE, UNSPECIFIED WHETHER ESOPHAGITIS PRESENT: ICD-10-CM

## 2025-02-12 DIAGNOSIS — G89.29 CHRONIC RIGHT SHOULDER PAIN: ICD-10-CM

## 2025-02-12 DIAGNOSIS — F33.1 MODERATE EPISODE OF RECURRENT MAJOR DEPRESSIVE DISORDER: ICD-10-CM

## 2025-02-12 PROCEDURE — 99214 OFFICE O/P EST MOD 30 MIN: CPT | Performed by: NURSE PRACTITIONER

## 2025-02-12 RX ORDER — VENLAFAXINE HYDROCHLORIDE 37.5 MG/1
37.5 CAPSULE, EXTENDED RELEASE ORAL DAILY
Qty: 90 CAPSULE | Refills: 1 | Status: SHIPPED | OUTPATIENT
Start: 2025-02-12

## 2025-02-12 RX ORDER — HYDROCHLOROTHIAZIDE 12.5 MG/1
12.5 TABLET ORAL DAILY
Qty: 90 TABLET | Refills: 1 | Status: SHIPPED | OUTPATIENT
Start: 2025-02-12

## 2025-02-12 RX ORDER — SILVER SULFADIAZINE 10 MG/G
1 CREAM TOPICAL 2 TIMES DAILY
Qty: 50 G | Refills: 0 | Status: SHIPPED | OUTPATIENT
Start: 2025-02-12
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
## 2025-03-06 ENCOUNTER — TELEPHONE (OUTPATIENT)
Dept: FAMILY MEDICINE CLINIC | Age: 50
End: 2025-03-06
Payer: OTHER GOVERNMENT

## 2025-03-17 ENCOUNTER — LAB (OUTPATIENT)
Dept: LAB | Facility: HOSPITAL | Age: 50
End: 2025-03-17
Payer: OTHER GOVERNMENT

## 2025-03-17 DIAGNOSIS — Z13.6 SCREENING FOR CARDIOVASCULAR CONDITION: ICD-10-CM

## 2025-03-17 LAB
ALBUMIN SERPL-MCNC: 4 G/DL (ref 3.5–5.2)
ALBUMIN/GLOB SERPL: 1.3 G/DL
ALP SERPL-CCNC: 105 U/L (ref 39–117)
ALT SERPL W P-5'-P-CCNC: 15 U/L (ref 1–33)
ANION GAP SERPL CALCULATED.3IONS-SCNC: 11.6 MMOL/L (ref 5–15)
AST SERPL-CCNC: 18 U/L (ref 1–32)
BILIRUB SERPL-MCNC: <0.2 MG/DL (ref 0–1.2)
BUN SERPL-MCNC: 16 MG/DL (ref 6–20)
BUN/CREAT SERPL: 17.2 (ref 7–25)
CALCIUM SPEC-SCNC: 9.3 MG/DL (ref 8.6–10.5)
CHLORIDE SERPL-SCNC: 103 MMOL/L (ref 98–107)
CHOLEST SERPL-MCNC: 156 MG/DL (ref 0–200)
CO2 SERPL-SCNC: 24.4 MMOL/L (ref 22–29)
CREAT SERPL-MCNC: 0.93 MG/DL (ref 0.57–1)
EGFRCR SERPLBLD CKD-EPI 2021: 74.6 ML/MIN/1.73
GLOBULIN UR ELPH-MCNC: 3.1 GM/DL
GLUCOSE SERPL-MCNC: 105 MG/DL (ref 65–99)
HDLC SERPL-MCNC: 50 MG/DL (ref 40–60)
LDLC SERPL CALC-MCNC: 92 MG/DL (ref 0–100)
LDLC/HDLC SERPL: 1.82 {RATIO}
POTASSIUM SERPL-SCNC: 3.9 MMOL/L (ref 3.5–5.2)
PROT SERPL-MCNC: 7.1 G/DL (ref 6–8.5)
SODIUM SERPL-SCNC: 139 MMOL/L (ref 136–145)
TRIGL SERPL-MCNC: 74 MG/DL (ref 0–150)
VLDLC SERPL-MCNC: 14 MG/DL (ref 5–40)

## 2025-03-17 PROCEDURE — 36415 COLL VENOUS BLD VENIPUNCTURE: CPT

## 2025-03-17 PROCEDURE — 80061 LIPID PANEL: CPT

## 2025-03-17 PROCEDURE — 80053 COMPREHEN METABOLIC PANEL: CPT

## 2025-03-25 DIAGNOSIS — I10 HYPERTENSION, UNSPECIFIED TYPE: ICD-10-CM

## 2025-03-26 RX ORDER — HYDROCHLOROTHIAZIDE 12.5 MG/1
12.5 TABLET ORAL DAILY
Qty: 90 TABLET | Refills: 1 | OUTPATIENT
Start: 2025-03-26

## 2025-03-26 NOTE — TELEPHONE ENCOUNTER
Rx Refill Note  Requested Prescriptions     Pending Prescriptions Disp Refills    hydroCHLOROthiazide 12.5 MG tablet [Pharmacy Med Name: HYDROCHLOROTHIAZIDE 12.5MG TABLETS] 90 tablet 1     Sig: TAKE 1 TABLET BY MOUTH DAILY      Last office visit with prescribing clinician: 2/12/2025     Next office visit with prescribing clinician: 8/13/2025     Last dispensed 03/24/2025     Jose Daniel Reynolds MA  03/26/25, 09:31 EDT

## 2025-04-11 DIAGNOSIS — F33.1 MODERATE EPISODE OF RECURRENT MAJOR DEPRESSIVE DISORDER: ICD-10-CM

## 2025-04-11 RX ORDER — VENLAFAXINE HYDROCHLORIDE 37.5 MG/1
37.5 CAPSULE, EXTENDED RELEASE ORAL DAILY
Qty: 90 CAPSULE | Refills: 1 | Status: SHIPPED | OUTPATIENT
Start: 2025-04-11

## 2025-05-06 DIAGNOSIS — K21.9 GASTROESOPHAGEAL REFLUX DISEASE, UNSPECIFIED WHETHER ESOPHAGITIS PRESENT: ICD-10-CM

## 2025-05-06 RX ORDER — OMEPRAZOLE 20 MG/1
20 CAPSULE, DELAYED RELEASE ORAL DAILY
Qty: 90 CAPSULE | Refills: 1 | OUTPATIENT
Start: 2025-05-06

## 2025-05-07 DIAGNOSIS — K21.9 GASTROESOPHAGEAL REFLUX DISEASE, UNSPECIFIED WHETHER ESOPHAGITIS PRESENT: ICD-10-CM

## 2025-05-07 RX ORDER — OMEPRAZOLE 20 MG/1
20 CAPSULE, DELAYED RELEASE ORAL DAILY
Qty: 90 CAPSULE | Refills: 1 | Status: SHIPPED | OUTPATIENT
Start: 2025-05-07

## 2025-07-16 DIAGNOSIS — E66.3 OVERWEIGHT: Primary | ICD-10-CM

## 2025-07-17 ENCOUNTER — TELEPHONE (OUTPATIENT)
Dept: FAMILY MEDICINE CLINIC | Age: 50
End: 2025-07-17
Payer: OTHER GOVERNMENT

## 2025-07-17 NOTE — TELEPHONE ENCOUNTER
Caller: Ernestine Condon    Relationship: Self    Best call back number:   Telephone Information:   Mobile [phone]        Who are you requesting to speak with (clinical staff, provider,  specific staff member): PROVIDER     What was the call regarding: PATIENT STATES HER INSURANCE LET HER KNOW IT NEEDS TO BE 90 DAYS SUPPLY FOR THEM TO COVER IT AND SHE NEEDS THE PRIOR AUTHORIZATION DONE WITHIN 5 DAYS OF THE ZEPBOUND

## 2025-07-18 DIAGNOSIS — E66.3 OVERWEIGHT: ICD-10-CM

## 2025-08-05 ENCOUNTER — TELEPHONE (OUTPATIENT)
Dept: FAMILY MEDICINE CLINIC | Age: 50
End: 2025-08-05
Payer: OTHER GOVERNMENT

## 2025-08-13 ENCOUNTER — OFFICE VISIT (OUTPATIENT)
Dept: FAMILY MEDICINE CLINIC | Age: 50
End: 2025-08-13
Payer: OTHER GOVERNMENT

## 2025-08-13 VITALS
OXYGEN SATURATION: 97 % | HEIGHT: 66 IN | BODY MASS INDEX: 28.54 KG/M2 | SYSTOLIC BLOOD PRESSURE: 143 MMHG | DIASTOLIC BLOOD PRESSURE: 92 MMHG | WEIGHT: 177.6 LBS | TEMPERATURE: 98.3 F | HEART RATE: 83 BPM

## 2025-08-13 DIAGNOSIS — K21.9 GASTROESOPHAGEAL REFLUX DISEASE, UNSPECIFIED WHETHER ESOPHAGITIS PRESENT: ICD-10-CM

## 2025-08-13 DIAGNOSIS — I10 PRIMARY HYPERTENSION: ICD-10-CM

## 2025-08-13 DIAGNOSIS — Z12.11 SCREENING FOR COLON CANCER: Primary | ICD-10-CM

## 2025-08-13 DIAGNOSIS — Z13.6 SCREENING FOR CARDIOVASCULAR CONDITION: ICD-10-CM

## 2025-08-13 PROCEDURE — 99214 OFFICE O/P EST MOD 30 MIN: CPT | Performed by: NURSE PRACTITIONER

## 2025-08-13 RX ORDER — OMEPRAZOLE 20 MG/1
20 CAPSULE, DELAYED RELEASE ORAL DAILY
Qty: 90 CAPSULE | Refills: 1 | Status: SHIPPED | OUTPATIENT
Start: 2025-08-13

## 2025-08-13 RX ORDER — HYDROCHLOROTHIAZIDE 25 MG/1
25 TABLET ORAL DAILY
Qty: 90 TABLET | Refills: 1 | Status: SHIPPED | OUTPATIENT
Start: 2025-08-13